# Patient Record
Sex: FEMALE | Race: OTHER | Employment: PART TIME | URBAN - METROPOLITAN AREA
[De-identification: names, ages, dates, MRNs, and addresses within clinical notes are randomized per-mention and may not be internally consistent; named-entity substitution may affect disease eponyms.]

---

## 2017-07-28 ENCOUNTER — HOSPITAL ENCOUNTER (EMERGENCY)
Age: 42
Discharge: HOME | End: 2017-07-28
Attending: EMERGENCY MEDICINE
Payer: SELF-PAY

## 2017-07-28 ENCOUNTER — APPOINTMENT (OUTPATIENT)
Dept: GENERAL RADIOLOGY | Age: 42
End: 2017-07-28
Attending: EMERGENCY MEDICINE
Payer: SELF-PAY

## 2017-07-28 VITALS
WEIGHT: 191 LBS | HEIGHT: 64 IN | SYSTOLIC BLOOD PRESSURE: 103 MMHG | RESPIRATION RATE: 20 BRPM | HEART RATE: 60 BPM | TEMPERATURE: 98.2 F | OXYGEN SATURATION: 96 % | DIASTOLIC BLOOD PRESSURE: 65 MMHG | BODY MASS INDEX: 32.61 KG/M2

## 2017-07-28 DIAGNOSIS — R07.9 CHEST PAIN, UNSPECIFIED TYPE: Primary | ICD-10-CM

## 2017-07-28 LAB
ALBUMIN SERPL BCP-MCNC: 3.8 G/DL (ref 3.5–5)
ALBUMIN/GLOB SERPL: 0.8 {RATIO} (ref 1.1–2.2)
ALP SERPL-CCNC: 57 U/L (ref 45–117)
ALT SERPL-CCNC: 20 U/L (ref 12–78)
ANION GAP BLD CALC-SCNC: 6 MMOL/L (ref 5–15)
APPEARANCE UR: CLEAR
APTT PPP: 29 SEC (ref 22.1–32.5)
AST SERPL W P-5'-P-CCNC: 19 U/L (ref 15–37)
BACTERIA URNS QL MICRO: NEGATIVE /HPF
BASOPHILS # BLD AUTO: 0.1 K/UL (ref 0–0.1)
BASOPHILS # BLD: 1 % (ref 0–1)
BILIRUB SERPL-MCNC: 0.4 MG/DL (ref 0.2–1)
BILIRUB UR QL: NEGATIVE
BUN SERPL-MCNC: 12 MG/DL (ref 6–20)
BUN/CREAT SERPL: 16 (ref 12–20)
CALCIUM SERPL-MCNC: 9.2 MG/DL (ref 8.5–10.1)
CHLORIDE SERPL-SCNC: 102 MMOL/L (ref 97–108)
CK SERPL-CCNC: 73 U/L (ref 26–192)
CO2 SERPL-SCNC: 27 MMOL/L (ref 21–32)
COLOR UR: NORMAL
CREAT SERPL-MCNC: 0.76 MG/DL (ref 0.55–1.02)
D DIMER PPP FEU-MCNC: 0.35 MG/L FEU (ref 0–0.65)
EOSINOPHIL # BLD: 0.4 K/UL (ref 0–0.4)
EOSINOPHIL NFR BLD: 4 % (ref 0–7)
EPITH CASTS URNS QL MICRO: NORMAL /LPF
ERYTHROCYTE [DISTWIDTH] IN BLOOD BY AUTOMATED COUNT: 13.1 % (ref 11.5–14.5)
GLOBULIN SER CALC-MCNC: 5 G/DL (ref 2–4)
GLUCOSE SERPL-MCNC: 86 MG/DL (ref 65–100)
GLUCOSE UR STRIP.AUTO-MCNC: NEGATIVE MG/DL
HCG UR QL: NEGATIVE
HCT VFR BLD AUTO: 41 % (ref 35–47)
HGB BLD-MCNC: 13.8 G/DL (ref 11.5–16)
HGB UR QL STRIP: NEGATIVE
HYALINE CASTS URNS QL MICRO: NORMAL /LPF (ref 0–5)
INR PPP: 1 (ref 0.9–1.1)
KETONES UR QL STRIP.AUTO: NEGATIVE MG/DL
LEUKOCYTE ESTERASE UR QL STRIP.AUTO: NEGATIVE
LIPASE SERPL-CCNC: 187 U/L (ref 73–393)
LYMPHOCYTES # BLD AUTO: 29 % (ref 12–49)
LYMPHOCYTES # BLD: 2.8 K/UL (ref 0.8–3.5)
MCH RBC QN AUTO: 28.5 PG (ref 26–34)
MCHC RBC AUTO-ENTMCNC: 33.7 G/DL (ref 30–36.5)
MCV RBC AUTO: 84.5 FL (ref 80–99)
MONOCYTES # BLD: 0.9 K/UL (ref 0–1)
MONOCYTES NFR BLD AUTO: 9 % (ref 5–13)
NEUTS SEG # BLD: 5.6 K/UL (ref 1.8–8)
NEUTS SEG NFR BLD AUTO: 57 % (ref 32–75)
NITRITE UR QL STRIP.AUTO: NEGATIVE
PH UR STRIP: 6 [PH] (ref 5–8)
PLATELET # BLD AUTO: 289 K/UL (ref 150–400)
POTASSIUM SERPL-SCNC: 4 MMOL/L (ref 3.5–5.1)
PROT SERPL-MCNC: 8.8 G/DL (ref 6.4–8.2)
PROT UR STRIP-MCNC: NEGATIVE MG/DL
PROTHROMBIN TIME: 10.6 SEC (ref 9–11.1)
RBC # BLD AUTO: 4.85 M/UL (ref 3.8–5.2)
RBC #/AREA URNS HPF: NORMAL /HPF (ref 0–5)
SODIUM SERPL-SCNC: 135 MMOL/L (ref 136–145)
SP GR UR REFRACTOMETRY: <1.005 (ref 1–1.03)
THERAPEUTIC RANGE,PTTT: NORMAL SECS (ref 58–77)
TROPONIN I SERPL-MCNC: <0.04 NG/ML
UROBILINOGEN UR QL STRIP.AUTO: 0.2 EU/DL (ref 0.2–1)
WBC # BLD AUTO: 9.8 K/UL (ref 3.6–11)
WBC URNS QL MICRO: NORMAL /HPF (ref 0–4)

## 2017-07-28 PROCEDURE — 99284 EMERGENCY DEPT VISIT MOD MDM: CPT

## 2017-07-28 PROCEDURE — 82550 ASSAY OF CK (CPK): CPT | Performed by: EMERGENCY MEDICINE

## 2017-07-28 PROCEDURE — 85379 FIBRIN DEGRADATION QUANT: CPT | Performed by: EMERGENCY MEDICINE

## 2017-07-28 PROCEDURE — 83690 ASSAY OF LIPASE: CPT | Performed by: EMERGENCY MEDICINE

## 2017-07-28 PROCEDURE — 81001 URINALYSIS AUTO W/SCOPE: CPT | Performed by: EMERGENCY MEDICINE

## 2017-07-28 PROCEDURE — 36415 COLL VENOUS BLD VENIPUNCTURE: CPT | Performed by: EMERGENCY MEDICINE

## 2017-07-28 PROCEDURE — 85730 THROMBOPLASTIN TIME PARTIAL: CPT | Performed by: EMERGENCY MEDICINE

## 2017-07-28 PROCEDURE — 80053 COMPREHEN METABOLIC PANEL: CPT | Performed by: EMERGENCY MEDICINE

## 2017-07-28 PROCEDURE — 85610 PROTHROMBIN TIME: CPT | Performed by: EMERGENCY MEDICINE

## 2017-07-28 PROCEDURE — 93005 ELECTROCARDIOGRAM TRACING: CPT

## 2017-07-28 PROCEDURE — 71020 XR CHEST PA LAT: CPT

## 2017-07-28 PROCEDURE — 81025 URINE PREGNANCY TEST: CPT | Performed by: EMERGENCY MEDICINE

## 2017-07-28 PROCEDURE — 84484 ASSAY OF TROPONIN QUANT: CPT | Performed by: EMERGENCY MEDICINE

## 2017-07-28 PROCEDURE — 85025 COMPLETE CBC W/AUTO DIFF WBC: CPT | Performed by: EMERGENCY MEDICINE

## 2017-07-28 RX ORDER — CETIRIZINE HCL 10 MG
10 TABLET ORAL DAILY
COMMUNITY
End: 2020-10-08

## 2017-07-28 RX ORDER — HYDROXYZINE 50 MG/1
50 TABLET, FILM COATED ORAL
COMMUNITY
End: 2019-12-23

## 2017-07-28 RX ORDER — ATORVASTATIN CALCIUM 10 MG/1
10 TABLET, FILM COATED ORAL DAILY
COMMUNITY
End: 2019-12-23

## 2017-07-28 NOTE — Clinical Note
Thank you for allowing us to provide you with medical care today. We realize that you have many choices for your emergency care needs. We thank you for choosing [address].  Please choose us in the future for any continued health care need s. We hope we addressed all of your medical concerns. We strive to provide excellent quality care in the Emergency Department. Anything less than excellent does not meet our expectations. The exam and treatment you received in the Emergency Depa rtment were for an emergent problem and are not intended as complete care. It is important that you follow up with a doctor, nurse practitioner, or [address] [place] [address] assistant for ongoing care. If your symptoms worsen or you do not improve as expected and you  are unable to reach your usual health care provider, you should return to the Emergency Department. We are available 24 hours a day. Take this sheet with you when you go to your follow-up visit. If you have any problem arranging the follow-up vis it, contact the Emergency Department immediately. Make an appointment your family doctor for follow up of this visit. Return to the ER if you are unable to be seen in a timely manner.

## 2017-07-28 NOTE — ED PROVIDER NOTES
HPI Comments: 38 y.o. female with past medical history significant for lymphoma who presents to the ED with chief complaint of chest pain. Patient reports \"constant, dull\" chest pain and intermittent chills with onset ~5-6 hours ago. Patient reports similar symptoms ~2 days ago. Patient reports her mother passed away ~2 days ago; reports being under increased stress due to her loss. Patient reports being seen by her PCP for her symptoms; reports being referred to the ED for further evaluation for continued symptoms. Patient denies abdominal pain, vomiting, and loss of appetite. There are no other acute medical concerns at this time. PCP: See Beverly MD    Note written by Michelle Olson, as dictated by Chaparrita Shepard MD 5:12 PM     The history is provided by the patient. Past Medical History:   Diagnosis Date    Lymphoma [facility])        History reviewed. No pertinent surgical history. History reviewed. No pertinent family history. Social History     Social History    Marital status:      Spouse name: N/A    Number of children: N/A    Years of education: N/A     Occupational History    Not on file. Social History Main Topics    Smoking status: Never Smoker    Smokeless tobacco: Not on file    Alcohol use No    Drug use: Not on file    Sexual activity: Not on file     Other Topics Concern    Not on file     Social History Narrative         ALLERGIES: Iodinated contrast- oral and iv dye    Review of Systems   Constitutional: Positive for chills. Negative for appetite change. Cardiovascular: Positive for chest pain. Gastrointestinal: Negative for abdominal pain and vomiting. All other systems reviewed and are negative.       Vitals:    07/28/17 1541   BP: 125/84   Pulse: 76   Resp: 18   Temp: 98.3 °F (36.8 °C)   SpO2: 96%   Weight: 86.6 kg (191 lb)   Height: 5' 4\" (1.626 m)            Physical Exam   Constitutional: She is oriented to person, place, and time. She appears well-developed and well-nourished. No distress. NAD, AxOx4, speaking in complete sentences     HENT:   Head: Normocephalic and atraumatic. Nose: Nose normal.   Mouth/Throat: Oropharynx is clear and moist.   Eyes: Conjunctivae and EOM are normal. Pupils are equal, round, and reactive to light. Right eye exhibits no discharge. Left eye exhibits no discharge. No scleral icterus. Neck: Normal range of motion. Neck supple. No JVD present. No tracheal deviation present. Cardiovascular: Normal rate, regular rhythm, normal heart sounds and intact distal pulses. Exam reveals no gallop and no friction rub. No murmur heard. Pulmonary/Chest: Effort normal and breath sounds normal. No respiratory distress. She has no wheezes. She has no rales. She exhibits no tenderness. Abdominal: Soft. Bowel sounds are normal. There is no tenderness. There is no rebound and no guarding. nttp   Genitourinary: No vaginal discharge found. Musculoskeletal: Normal range of motion. She exhibits no edema or tenderness. Neurological: She is alert and oriented to person, place, and time. She has normal reflexes. No cranial nerve deficit. She exhibits normal muscle tone. Coordination normal.   pt has motor/ CV/ Sensation grossly intact to all extremities, R = L in strength;   Skin: Skin is warm and dry. No rash noted. No erythema. No pallor. Psychiatric: She has a normal mood and affect. Her behavior is normal. Thought content normal.   Sad about her mother's passing 3 d ago/ cp x 2d since;    Nursing note and vitals reviewed. [facility]  ED Course       Procedures      Chief Complaint   Patient presents with    Chest Pain    Shortness of Breath       6:02 PM  The patients presenting problems have been discussed, and they are in agreement with the care plan formulated and outlined with them. I have encouraged them to ask questions as they arise throughout their visit.     MEDICATIONS GIVEN:  Medications - No data to display    LABS REVIEWED:  Labs Reviewed   METABOLIC PANEL, COMPREHENSIVE - Abnormal; Notable for the following:        Result Value    Sodium 135 (*)     Protein, total 8.8 (*)     Globulin 5.0 (*)     A-G Ratio 0.8 (*)     All other components within normal limits   CBC WITH AUTOMATED DIFF   CK W/ REFLX CKMB   PROTHROMBIN TIME + INR   PTT   D DIMER   LIPASE   HCG URINE, QL   URINALYSIS W/MICROSCOPIC   TROPONIN I   SAMPLES BEING HELD       RADIOLOGY RESULTS:  The following have been ordered and reviewed:  _____________________________________________________________________  _____________________________________________________________________    EKG interpretation: (Preliminary)  Rhythm: normal sinus rhythm; and regular . Rate (approx.): 70; Axis: normal; P wave: normal; QRS interval: normal ; ST/T wave: normal; Negative acute significant segmental elevations    PROCEDURES:        CONSULTATIONS:       PROGRESS NOTES:      DIAGNOSIS:    1. Chest pain, unspecified type        PLAN:  1-Chest Pain / neg ed evaluation - will have pt follow-up with Cardiology;       ED COURSE: The patients hospital course has been uncomplicated. 6:32 PM  Kalie Wiggins's  results have been reviewed with her. She has been counseled regarding her diagnosis. She verbally conveys understanding and agreement of the signs, symptoms, diagnosis, treatment and prognosis and additionally agrees to Call/ Arrange follow up as recommended with Dr. Aniyah Navarrete MD in 24 - 48 hours. She also agrees with the care-plan and conveys that all of her questions have been answered. I have also put together some discharge instructions for her that include: 1) educational information regarding their diagnosis, 2) how to care for their diagnosis at home, as well a 3) list of reasons why they would want to return to the ED prior to their follow-up appointment, should their condition change or for concerns.

## 2017-07-28 NOTE — DISCHARGE INSTRUCTIONS
Chest Pain: Care Instructions  Your Care Instructions  There are many things that can cause chest pain. Some are not serious and will get better on their own in a few days. But some kinds of chest pain need more testing and treatment. Your doctor may have recommended a follow-up visit in the next 8 to 12 hours. If you are not getting better, you may need more tests or treatment. Even though your doctor has released you, you still need to watch for any problems. The doctor carefully checked you, but sometimes problems can develop later. If you have new symptoms or if your symptoms do not get better, get medical care right away. If you have worse or different chest pain or pressure that lasts more than 5 minutes or you passed out (lost consciousness), call 911 or seek other emergency help right away. A medical visit is only one step in your treatment. Even if you feel better, you still need to do what your doctor recommends, such as going to all suggested follow-up appointments and taking medicines exactly as directed. This will help you recover and help prevent future problems. How can you care for yourself at home? · Rest until you feel better. · Take your medicine exactly as prescribed. Call your doctor if you think you are having a problem with your medicine. · Do not drive after taking a prescription pain medicine. When should you call for help? Call 911 if:  · You passed out (lost consciousness). · You have severe difficulty breathing. · You have symptoms of a heart attack. These may include:  ¨ Chest pain or pressure, or a strange feeling in your chest.  ¨ Sweating. ¨ Shortness of breath. ¨ Nausea or vomiting. ¨ Pain, pressure, or a strange feeling in your back, neck, jaw, or upper belly or in one or both shoulders or arms. ¨ Lightheadedness or sudden weakness. ¨ A fast or irregular heartbeat.   After you call 911, the  may tell you to chew 1 adult-strength or 2 to 4 low-dose aspirin. Wait for an ambulance. Do not try to drive yourself. Call your doctor today if:  · You have any trouble breathing. · Your chest pain gets worse. · You are dizzy or lightheaded, or you feel like you may faint. · You are not getting better as expected. · You are having new or different chest pain. Where can you learn more? Go to http://bing-willis.info/. Enter A120 in the search box to learn more about \"Chest Pain: Care Instructions. \"  Current as of: March 20, 2017  Content Version: 11.3  © 9729-2789 [employer]. Care instructions adapted under license by [employer] (which disclaims liability or warranty for this information). If you have questions about a medical condition or this instruction, always ask your healthcare professional. [address] any warranty or liability for your use of this information. We hope that we have addressed all of your medical concerns. The examination and treatment you received in the Emergency Department were for an emergent problem and were not intended as complete care. It is important that you follow up with your healthcare provider(s) for ongoing care. If your symptoms worsen or do not improve as expected, and you are unable to reach your usual health care provider(s), you should return to the Emergency Department. Today's healthcare is undergoing tremendous change, and patient satisfaction surveys are one of the many tools to assess the quality of medical care. You may receive a survey from the [employer] organization regarding your experience in the Emergency Department. I hope that your experience has been completely positive, particularly the medical care that I provided. As such, please participate in the survey; anything less than excellent does not meet my expectations or intentions.         [facility] and [facility] participate in nationally recognized quality of care measures. If your blood pressure is greater than 120/80, as reported below, we urge that you seek medical care to address the potential of high blood pressure, commonly known as hypertension. Hypertension can be hereditary or can be caused by certain medical conditions, pain, stress, or \"white coat syndrome. \"       Please make an appointment with your health care provider(s) for follow up of your Emergency Department visit. VITALS:   Patient Vitals for the past 8 hrs:   Temp Pulse Resp BP SpO2   07/28/17 1541 98.3 °F (36.8 °C) 76 18 125/84 96 %          Thank you for allowing us to provide you with medical care today. We realize that you have many choices for your emergency care needs. Please choose us in the future for any continued health care needs. [address]ion[address] Sage [address], [address] [facility].   Office: [phone]            Recent Results (from the past 24 hour(s))   EKG, 12 LEAD, INITIAL    Collection Time: 07/28/17  3:51 PM   Result Value Ref Range    Ventricular Rate 70 BPM    Atrial Rate 70 BPM    P-R Interval 122 ms    QRS Duration 74 ms    Q-T Interval 400 ms    QTC Calculation (Bezet) 432 ms    Calculated P Axis 36 degrees    Calculated R Axis 48 degrees    Calculated T Axis 41 degrees    Diagnosis       ** Poor data quality, interpretation may be adversely affected  Normal sinus rhythm  When compared with ECG of 10-OCT-2014 20:55,  No significant change was found     CBC WITH AUTOMATED DIFF    Collection Time: 07/28/17  4:43 PM   Result Value Ref Range    WBC 9.8 3.6 - 11.0 K/uL    RBC 4.85 3.80 - 5.20 M/uL    HGB 13.8 11.5 - 16.0 g/dL    HCT 41.0 35.0 - 47.0 %    MCV 84.5 80.0 - 99.0 FL    MCH 28.5 26.0 - 34.0 PG    MCHC 33.7 30.0 - 36.5 g/dL    RDW 13.1 11.5 - 14.5 %    PLATELET 971 268 - 962 K/uL    NEUTROPHILS 57 32 - 75 %    LYMPHOCYTES 29 12 - 49 %    MONOCYTES 9 5 - 13 %    EOSINOPHILS 4 0 - 7 %    BASOPHILS 1 0 - 1 %    ABS. NEUTROPHILS 5.6 1.8 - 8.0 K/UL    ABS. LYMPHOCYTES 2.8 0.8 - 3.5 K/UL    ABS. MONOCYTES 0.9 0.0 - 1.0 K/UL    ABS. EOSINOPHILS 0.4 0.0 - 0.4 K/UL    ABS. BASOPHILS 0.1 0.0 - 0.1 K/UL   METABOLIC PANEL, COMPREHENSIVE    Collection Time: 07/28/17  4:43 PM   Result Value Ref Range    Sodium 135 (L) 136 - 145 mmol/L    Potassium 4.0 3.5 - 5.1 mmol/L    Chloride 102 97 - 108 mmol/L    CO2 27 21 - 32 mmol/L    Anion gap 6 5 - 15 mmol/L    Glucose 86 65 - 100 mg/dL    BUN 12 6 - 20 MG/DL    Creatinine 0.76 0.55 - 1.02 MG/DL    BUN/Creatinine ratio 16 12 - 20      GFR est AA >60 >60 ml/min/1.73m2    GFR est non-AA >60 >60 ml/min/1.73m2    Calcium 9.2 8.5 - 10.1 MG/DL    Bilirubin, total 0.4 0.2 - 1.0 MG/DL    ALT (SGPT) 20 12 - 78 U/L    AST (SGOT) 19 15 - 37 U/L    Alk. phosphatase 57 45 - 117 U/L    Protein, total 8.8 (H) 6.4 - 8.2 g/dL    Albumin 3.8 3.5 - 5.0 g/dL    Globulin 5.0 (H) 2.0 - 4.0 g/dL    A-G Ratio 0.8 (L) 1.1 - 2.2     CK W/ REFLX CKMB    Collection Time: 07/28/17  4:43 PM   Result Value Ref Range    CK 73 26 - 192 U/L   PROTHROMBIN TIME + INR    Collection Time: 07/28/17  4:43 PM   Result Value Ref Range    INR 1.0 0.9 - 1.1      Prothrombin time 10.6 9.0 - 11.1 sec   PTT    Collection Time: 07/28/17  4:43 PM   Result Value Ref Range    aPTT 29.0 22.1 - 32.5 sec    aPTT, therapeutic range     58.0 - 77.0 SECS   D DIMER    Collection Time: 07/28/17  4:43 PM   Result Value Ref Range    D-dimer 0.35 0.00 - 0.65 mg/L FEU   LIPASE    Collection Time: 07/28/17  4:43 PM   Result Value Ref Range    Lipase 187 73 - 393 U/L       Xr Chest Pa Lat    Result Date: 7/28/2017  Exam:  2 view chest Indication: Chest pain and shortness of breath beginning this morning and getting worse. COMPARISON: 5-2-2007 PA and lateral views demonstrate normal heart size. The patient is on a cardiac monitor. The lungs demonstrate peribronchial cuffing in the perihilar regions.  Additionally there is some increased opacity in the lung fields in both paramediastinal regions in the upper lung zones. All these findings are similar to the previous examination. The peribronchial cuffing could be related to asthma or chronic bronchitic type process. The increased opacities in the paramediastinal regions bilaterally could be related to post radiation change in this patient with a history of lymphoma. Clinical correlation is suggested. There is no focal airspace disease to suggest acute pneumonia. No adenopathy or pleural effusions. IMPRESSION: 1. Chronic changes in the lungs with peribronchial cuffing as described similar to the previous examination. Additionally there are opacities in the paramediastinal regions bilaterally in the upper lung zones that are also stable and could be related to post radiation change.  Clinical correlation is suggested

## 2017-07-29 LAB
ATRIAL RATE: 70 BPM
CALCULATED P AXIS, ECG09: 36 DEGREES
CALCULATED R AXIS, ECG10: 48 DEGREES
CALCULATED T AXIS, ECG11: 41 DEGREES
DIAGNOSIS, 93000: NORMAL
P-R INTERVAL, ECG05: 122 MS
Q-T INTERVAL, ECG07: 400 MS
QRS DURATION, ECG06: 74 MS
QTC CALCULATION (BEZET), ECG08: 432 MS
VENTRICULAR RATE, ECG03: 70 BPM

## 2017-11-16 ENCOUNTER — HOSPITAL ENCOUNTER (EMERGENCY)
Age: 42
Discharge: HOME | End: 2017-11-16
Attending: FAMILY MEDICINE

## 2017-11-16 VITALS
SYSTOLIC BLOOD PRESSURE: 110 MMHG | TEMPERATURE: 97.8 F | DIASTOLIC BLOOD PRESSURE: 55 MMHG | OXYGEN SATURATION: 98 % | RESPIRATION RATE: 16 BRPM | HEART RATE: 88 BPM | HEIGHT: 64 IN | WEIGHT: 191 LBS | BODY MASS INDEX: 32.61 KG/M2

## 2017-11-16 DIAGNOSIS — J02.9 ACUTE VIRAL PHARYNGITIS: Primary | ICD-10-CM

## 2017-11-16 RX ORDER — BENZONATATE 200 MG/1
200 CAPSULE ORAL
Qty: 21 CAP | Refills: 0 | Status: SHIPPED | OUTPATIENT
Start: 2017-11-16 | End: 2017-11-23

## 2017-11-16 RX ORDER — PREDNISONE 5 MG/1
TABLET ORAL
Qty: 21 TAB | Refills: 0 | Status: SHIPPED | OUTPATIENT
Start: 2017-11-16 | End: 2019-12-23

## 2017-11-16 NOTE — LETTER
[facility] 
[address] [address] 
[phone] Work/School Note Date: 11/16/2017 To Whom It May concern: 
 
Jemal Martinez was seen and treated today in the urgent care center by the following provider(s): 
Attending Provider: Christ Clarke MD.   
 
Jemal Martinez may return to work on 11/17/17. Sincerely, Christ Clarke MD

## 2017-11-17 NOTE — UC PROVIDER NOTE
Patient is a 38 y.o. female presenting with cold symptoms. The history is provided by the patient. Cold Symptoms    This is a new problem. The current episode started yesterday. The problem has not changed since onset. There has been no fever. Associated symptoms include congestion, ear pain and plugged ear sensation. Pertinent negatives include no sneezing. She has tried nothing for the symptoms. Past Medical History:   Diagnosis Date    Lymphoma [facility])         History reviewed. No pertinent surgical history. History reviewed. No pertinent family history. Social History     Social History    Marital status:      Spouse name: N/A    Number of children: N/A    Years of education: N/A     Occupational History    Not on file. Social History Main Topics    Smoking status: Never Smoker    Smokeless tobacco: Not on file    Alcohol use No    Drug use: Not on file    Sexual activity: Not on file     Other Topics Concern    Not on file     Social History Narrative                ALLERGIES: Iodinated contrast- oral and iv dye    Review of Systems   HENT: Positive for congestion and ear pain. Negative for sneezing. Vitals:    11/16/17 1936   BP: 110/55   Pulse: 88   Resp: 16   Temp: 97.8 °F (36.6 °C)   SpO2: 98%   Weight: 86.6 kg (191 lb)   Height: 5' 4\" (1.626 m)       Physical Exam   Constitutional: No distress. HENT:   Right Ear: Tympanic membrane and ear canal normal.   Left Ear: Tympanic membrane and ear canal normal.   Nose: Nose normal.   Mouth/Throat: No oropharyngeal exudate, posterior oropharyngeal edema or posterior oropharyngeal erythema. Eyes: Conjunctivae are normal. Right eye exhibits no discharge. Left eye exhibits no discharge. Neck: Neck supple. Pulmonary/Chest: Effort normal and breath sounds normal. No respiratory distress. She has no wheezes. She has no rales. Lymphadenopathy:     She has no cervical adenopathy. Skin: No rash noted.    Nursing note and vitals reviewed. MDM     Differential Diagnosis; Clinical Impression; Plan:     CLINICAL IMPRESSION:  Acute viral pharyngitis  (primary encounter diagnosis)      DDX    Plan:    Fluids/ gargles  Claritin/ allegra   Tylenol cold-sinus - max strength 1-2 tab 4 times/ day    with Advil as needed    If not better in 4-5 days - may use prednisone  mucinex DM     Amount and/or Complexity of Data Reviewed:    Review and summarize past medical records:  Yes  Risk of Significant Complications, Morbidity, and/or Mortality:   Presenting problems: Moderate  Management options:   Moderate  Progress:   Patient progress:  Stable      Procedures

## 2017-11-17 NOTE — DISCHARGE INSTRUCTIONS
Sore Throat: Care Instructions  Your Care Instructions    Infection by bacteria or a virus causes most sore throats. Cigarette smoke, dry air, air pollution, allergies, and yelling can also cause a sore throat. Sore throats can be painful and annoying. Fortunately, most sore throats go away on their own. If you have a bacterial infection, your doctor may prescribe antibiotics. Follow-up care is a key part of your treatment and safety. Be sure to make and go to all appointments, and call your doctor if you are having problems. It's also a good idea to know your test results and keep a list of the medicines you take. How can you care for yourself at home? · If your doctor prescribed antibiotics, take them as directed. Do not stop taking them just because you feel better. You need to take the full course of antibiotics. · Gargle with warm salt water once an hour to help reduce swelling and relieve discomfort. Use 1 teaspoon of salt mixed in 1 cup of warm water. · Take an over-the-counter pain medicine, such as acetaminophen (Tylenol), ibuprofen (Advil, Motrin), or naproxen (Aleve). Read and follow all instructions on the label. · Be careful when taking over-the-counter cold or flu medicines and Tylenol at the same time. Many of these medicines have acetaminophen, which is Tylenol. Read the labels to make sure that you are not taking more than the recommended dose. Too much acetaminophen (Tylenol) can be harmful. · Drink plenty of fluids. Fluids may help soothe an irritated throat. Hot fluids, such as tea or soup, may help decrease throat pain. · Use over-the-counter throat lozenges to soothe pain. Regular cough drops or hard candy may also help. These should not be given to young children because of the risk of choking. · Do not smoke or allow others to smoke around you. If you need help quitting, talk to your doctor about stop-smoking programs and medicines.  These can increase your chances of quitting for good. · Use a vaporizer or humidifier to add moisture to your bedroom. Follow the directions for cleaning the machine. When should you call for help? Call your doctor now or seek immediate medical care if:  ? · You have new or worse trouble swallowing. ? · Your sore throat gets much worse on one side. ? Watch closely for changes in your health, and be sure to contact your doctor if you do not get better as expected. Where can you learn more? Go to http://bing-willis.info/. Enter 062 [id number] 80 [id number] in the search box to learn more about \"Sore Throat: Care Instructions. \"  Current as of: May 12, 2017  Content Version: 11.4  © 1779-8495 [employer]. Care instructions adapted under license by [employer] (which disclaims liability or warranty for this information). If you have questions about a medical condition or this instruction, always ask your healthcare professional. [address] any warranty or liability for your use of this information.

## 2019-09-09 ENCOUNTER — OFFICE VISIT (OUTPATIENT)
Dept: URGENT CARE | Age: 44
End: 2019-09-09

## 2019-09-09 VITALS
RESPIRATION RATE: 18 BRPM | TEMPERATURE: 98 F | OXYGEN SATURATION: 100 % | WEIGHT: 189 LBS | HEART RATE: 72 BPM | DIASTOLIC BLOOD PRESSURE: 63 MMHG | BODY MASS INDEX: 31.49 KG/M2 | HEIGHT: 65 IN | SYSTOLIC BLOOD PRESSURE: 110 MMHG

## 2019-09-09 DIAGNOSIS — R07.9 CHEST PAIN, UNSPECIFIED TYPE: Primary | ICD-10-CM

## 2019-09-09 RX ORDER — MINERAL OIL
ENEMA (ML) RECTAL
COMMUNITY
End: 2020-10-08

## 2019-09-09 RX ORDER — ACYCLOVIR 800 MG/1
1000 TABLET ORAL DAILY
COMMUNITY

## 2019-09-09 RX ORDER — GUAIFENESIN 100 MG/5ML
81 LIQUID (ML) ORAL DAILY
COMMUNITY
End: 2019-12-23

## 2019-09-09 RX ORDER — SERTRALINE HYDROCHLORIDE 100 MG/1
TABLET, FILM COATED ORAL DAILY
COMMUNITY
End: 2019-12-23

## 2019-09-09 RX ORDER — HYDROXYZINE 25 MG/1
25 TABLET, FILM COATED ORAL
Qty: 12 TAB | Refills: 0 | Status: SHIPPED | OUTPATIENT
Start: 2019-09-09 | End: 2020-10-08

## 2019-09-09 RX ORDER — ATORVASTATIN CALCIUM 10 MG/1
TABLET, FILM COATED ORAL DAILY
COMMUNITY

## 2019-09-09 NOTE — PATIENT INSTRUCTIONS
Panic Attacks: Care Instructions  Your Care Instructions    During a panic attack, you may have a feeling of intense fear or terror, trouble breathing, chest pain or tightness, heartbeat changes, dizziness, sweating, and shaking. A panic attack starts suddenly and usually lasts from 5 to 20 minutes but may last even longer. You have the most anxiety about 10 minutes after the attack starts. An attack can begin with a stressful event, or it can happen without a cause. Although panic attacks can cause scary symptoms, you can learn to manage them with self-care, counseling, and medicine. Follow-up care is a key part of your treatment and safety. Be sure to make and go to all appointments, and call your doctor if you are having problems. It's also a good idea to know your test results and keep a list of the medicines you take. How can you care for yourself at home? · Take your medicine exactly as directed. Call your doctor if you think you are having a problem with your medicine. · Go to your counseling sessions and follow-up appointments. · Recognize and accept your anxiety. Then, when you are in a situation that makes you anxious, say to yourself, \"This is not an emergency. I feel uncomfortable, but I am not in danger. I can keep going even if I feel anxious. \"  · Be kind to your body:  ? Relieve tension with exercise or a massage. ? Get enough rest.  ? Avoid alcohol, caffeine, nicotine, and illegal drugs. They can increase your anxiety level, cause sleep problems, or trigger a panic attack. ? Learn and do relaxation techniques. See below for more about these techniques. · Engage your mind. Get out and do something you enjoy. Go to a funny movie, or take a walk or hike. Plan your day. Having too much or too little to do can make you anxious. · Keep a record of your symptoms. Discuss your fears with a good friend or family member, or join a support group for people with similar problems.  Talking to others sometimes relieves stress. · Get involved in social groups, or volunteer to help others. Being alone sometimes makes things seem worse than they are. · Get at least 30 minutes of exercise on most days of the week to relieve stress. Walking is a good choice. You also may want to do other activities, such as running, swimming, cycling, or playing tennis or team sports. Relaxation techniques  Do relaxation exercises for 10 to 20 minutes a day. You can play soothing, relaxing music while you do them, if you wish. · Tell others in your house that you are going to do your relaxation exercises. Ask them not to disturb you. · Find a comfortable place, away from all distractions and noise. · Lie down on your back, or sit with your back straight. · Focus on your breathing. Make it slow and steady. · Breathe in through your nose. Breathe out through either your nose or mouth. · Breathe deeply, filling up the area between your navel and your rib cage. Breathe so that your belly goes up and down. · Do not hold your breath. · Breathe like this for 5 to 10 minutes. Notice the feeling of calmness throughout your whole body. As you continue to breathe slowly and deeply, relax by doing the following for another 5 to 10 minutes:  · Tighten and relax each muscle group in your body. You can begin at your toes and work your way up to your head. · Imagine your muscle groups relaxing and becoming heavy. · Empty your mind of all thoughts. · Let yourself relax more and more deeply. · Become aware of the state of calmness that surrounds you. · When your relaxation time is over, you can bring yourself back to alertness by moving your fingers and toes and then your hands and feet and then stretching and moving your entire body. Sometimes people fall asleep during relaxation, but they usually wake up shortly afterward.   · Always give yourself time to return to full alertness before you drive a car or do anything that might cause an accident if you are not fully alert. Never play a relaxation tape while driving a car. When should you call for help? Call 911 anytime you think you may need emergency care. For example, call if:    · You feel you cannot stop from hurting yourself or someone else.    Watch closely for changes in your health, and be sure to contact your doctor if:    · Your panic attacks get worse.     · You have new or different anxiety.     · You are not getting better as expected. Where can you learn more? Go to http://bing-willis.info/. Enter H601 in the search box to learn more about \"Panic Attacks: Care Instructions. \"  Current as of: September 11, 2018  Content Version: 12.1  © 1200-1105 [employer]. Care instructions adapted under license by [employer] (which disclaims liability or warranty for this information). If you have questions about a medical condition or this instruction, always ask your healthcare professional. [place] [address] any warranty or liability for your use of this information. Anxiety Disorder: Care Instructions  Your Care Instructions    Anxiety is a normal reaction to stress. Difficult situations can cause you to have symptoms such as sweaty palms and a nervous feeling. In an anxiety disorder, the symptoms are far more severe. Constant worry, muscle tension, trouble sleeping, nausea and diarrhea, and other symptoms can make normal daily activities difficult or impossible. These symptoms may occur for no reason, and they can affect your work, school, or social life. Medicines, counseling, and self-care can all help. Follow-up care is a key part of your treatment and safety. Be sure to make and go to all appointments, and call your doctor if you are having problems. It's also a good idea to know your test results and keep a list of the medicines you take. How can you care for yourself at home?   · Take medicines exactly as directed. Call your doctor if you think you are having a problem with your medicine. · Go to your counseling sessions and follow-up appointments. · Recognize and accept your anxiety. Then, when you are in a situation that makes you anxious, say to yourself, \"This is not an emergency. I feel uncomfortable, but I am not in danger. I can keep going even if I feel anxious. \"  · Be kind to your body:  ? Relieve tension with exercise or a massage. ? Get enough rest.  ? Avoid alcohol, caffeine, nicotine, and illegal drugs. They can increase your anxiety level and cause sleep problems. ? Learn and do relaxation techniques. See below for more about these techniques. · Engage your mind. Get out and do something you enjoy. Go to a funny movie, or take a walk or hike. Plan your day. Having too much or too little to do can make you anxious. · Keep a record of your symptoms. Discuss your fears with a good friend or family member, or join a support group for people with similar problems. Talking to others sometimes relieves stress. · Get involved in social groups, or volunteer to help others. Being alone sometimes makes things seem worse than they are. · Get at least 30 minutes of exercise on most days of the week to relieve stress. Walking is a good choice. You also may want to do other activities, such as running, swimming, cycling, or playing tennis or team sports. Relaxation techniques  Do relaxation exercises 10 to 20 minutes a day. You can play soothing, relaxing music while you do them, if you wish. · Tell others in your house that you are going to do your relaxation exercises. Ask them not to disturb you. · Find a comfortable place, away from all distractions and noise. · Lie down on your back, or sit with your back straight. · Focus on your breathing. Make it slow and steady. · Breathe in through your nose. Breathe out through either your nose or mouth.   · Breathe deeply, filling up the area between your navel and your rib cage. Breathe so that your belly goes up and down. · Do not hold your breath. · Breathe like this for 5 to 10 minutes. Notice the feeling of calmness throughout your whole body. As you continue to breathe slowly and deeply, relax by doing the following for another 5 to 10 minutes:  · Tighten and relax each muscle group in your body. You can begin at your toes and work your way up to your head. · Imagine your muscle groups relaxing and becoming heavy. · Empty your mind of all thoughts. · Let yourself relax more and more deeply. · Become aware of the state of calmness that surrounds you. · When your relaxation time is over, you can bring yourself back to alertness by moving your fingers and toes and then your hands and feet and then stretching and moving your entire body. Sometimes people fall asleep during relaxation, but they usually wake up shortly afterward. · Always give yourself time to return to full alertness before you drive a car or do anything that might cause an accident if you are not fully alert. Never play a relaxation tape while you drive a car. When should you call for help? Call 911 anytime you think you may need emergency care. For example, call if:    · You feel you cannot stop from hurting yourself or someone else.   Niharika Mckee the numbers for these national suicide hotlines: 2-223-198-TALK (1-849.790.9220) and 6-014-VRAEKMN (0-955.917.4849). If you or someone you know talks about suicide or feeling hopeless, get help right away.   Watch closely for changes in your health, and be sure to contact your doctor if:    · You have anxiety or fear that affects your life.     · You have symptoms of anxiety that are new or different from those you had before. Where can you learn more? Go to http://bing-willis.info/. Enter P754 in the search box to learn more about \"Anxiety Disorder: Care Instructions. \"  Current as of: September 11, 2018  Content Version: 12.1  © 5779-7829 [employer]wise[employer] Inc[employer]. Care instructions adapted under license by [employer] (which disclaims liability or warranty for this information). If you have questions about a medical condition or this instruction, always ask your healthcare professional. [address] any warranty or liability for your use of this information.

## 2019-09-10 NOTE — PROGRESS NOTES
Chest Pain    The history is provided by the patient. This is a new problem. The current episode started 1 to 2 hours ago. The problem has been gradually improving. The pain is associated with normal activity. The pain is present in the substernal region. The pain is mild. The quality of the pain is described as pressure-like. The pain does not radiate. Exacerbated by: nothing  Pertinent negatives include no back pain, no cough, no diaphoresis, no dizziness, no fever, no irregular heartbeat, no nausea, no near-syncope, no palpitations, no PND and no weakness. She has tried nothing for the symptoms. Risk factors include stress. Her past medical history does not include DVT, HTN or PE. Past medical history comments: h/o CVA. Past Medical History:   Diagnosis Date    Non Hodgkin's lymphoma ([facility] [address]     Stroke [facility])         History reviewed. No pertinent surgical history. History reviewed. No pertinent family history.      Social History     Socioeconomic History    Marital status:      Spouse name: Not on file    Number of children: Not on file    Years of education: Not on file    Highest education level: Not on file   Occupational History    Not on file   Social Needs    Financial resource strain: Not on file    Food insecurity:     Worry: Not on file     Inability: Not on file    Transportation needs:     Medical: Not on file     Non-medical: Not on file   Tobacco Use    Smoking status: Never Smoker    Smokeless tobacco: Never Used   Substance and Sexual Activity    Alcohol use: Not on file    Drug use: Not on file    Sexual activity: Not on file   Lifestyle    Physical activity:     Days per week: Not on file     Minutes per session: Not on file    Stress: Not on file   Relationships    Social connections:     Talks on phone: Not on file     Gets together: Not on file     Attends Jew service: Not on file     Active member of club or organization: Not on file     Attends meetings of clubs or organizations: Not on file     Relationship status: Not on file    Intimate partner violence:     Fear of current or ex partner: Not on file     Emotionally abused: Not on file     Physically abused: Not on file     Forced sexual activity: Not on file   Other Topics Concern    Not on file   Social History Narrative    Not on file                ALLERGIES: Hydrocodone and Iodine    Review of Systems   Constitutional: Negative for diaphoresis and fever. Respiratory: Negative for cough. Cardiovascular: Positive for chest pain. Negative for palpitations, PND and near-syncope. Gastrointestinal: Negative for nausea. Musculoskeletal: Negative for back pain. Neurological: Negative for dizziness and weakness. All other systems reviewed and are negative. Vitals:    09/09/19 1730   BP: 110/63   Pulse: 72   Resp: 18   Temp: 98 °F (36.7 °C)   SpO2: 100%   Weight: 189 lb (85.7 kg)   Height: 5' 4.5\" (1.638 m)       Physical Exam   Constitutional: No distress. HENT:   Right Ear: Tympanic membrane and ear canal normal.   Left Ear: Tympanic membrane and ear canal normal.   Nose: Nose normal.   Mouth/Throat: No oropharyngeal exudate, posterior oropharyngeal edema or posterior oropharyngeal erythema. Eyes: Conjunctivae are normal. Right eye exhibits no discharge. Left eye exhibits no discharge. Neck: Neck supple. Cardiovascular: Normal rate, normal heart sounds and intact distal pulses. No murmur heard. Pulmonary/Chest: Effort normal and breath sounds normal. No respiratory distress. She has no wheezes. She has no rales. Lymphadenopathy:     She has no cervical adenopathy. Skin: No rash noted. Nursing note and vitals reviewed.       [facility]    EKG    Date/Time: 9/9/2019 8:38 PM  Performed by: Hosea Johansen MD  Authorized by: Hosea Johansen MD   Rhythm: sinus rhythm  Rate: normal  QRS axis: normal  Conduction: conduction normal  ST Segments: ST segments normal  Other: no other findings  Clinical impression: normal ECG              ICD-10-CM ICD-9-CM    1. Chest pain, unspecified type R07.9 786.50 EKG, 12 LEAD, INITIAL    non cardiac- ? anxiety / panic attack     Medications Ordered Today   Medications    hydrOXYzine HCl (ATARAX) 25 mg tablet     Sig: Take 1 Tab by mouth every eight (8) hours as needed for Anxiety. Dispense:  12 Tab     Refill:  0     No results found for any visits on 09/09/19. The patients condition was discussed with the patient and they understand. The patient is to follow up with primary care doctor. If signs and symptoms become worse the pt is to go to the ER. The patient is to take medications as prescribed.

## 2019-09-27 ENCOUNTER — HOSPITAL ENCOUNTER (EMERGENCY)
Age: 44
Discharge: HOME | End: 2019-09-27
Attending: EMERGENCY MEDICINE
Payer: MEDICAID

## 2019-09-27 ENCOUNTER — OFFICE VISIT (OUTPATIENT)
Dept: URGENT CARE | Age: 44
End: 2019-09-27

## 2019-09-27 ENCOUNTER — APPOINTMENT (OUTPATIENT)
Dept: ULTRASOUND IMAGING | Age: 44
End: 2019-09-27
Attending: EMERGENCY MEDICINE
Payer: MEDICAID

## 2019-09-27 VITALS
BODY MASS INDEX: 32.19 KG/M2 | HEART RATE: 69 BPM | TEMPERATURE: 97.6 F | OXYGEN SATURATION: 95 % | SYSTOLIC BLOOD PRESSURE: 116 MMHG | RESPIRATION RATE: 16 BRPM | WEIGHT: 190.48 LBS | DIASTOLIC BLOOD PRESSURE: 78 MMHG

## 2019-09-27 VITALS
TEMPERATURE: 97.9 F | SYSTOLIC BLOOD PRESSURE: 110 MMHG | HEART RATE: 72 BPM | OXYGEN SATURATION: 99 % | HEIGHT: 65 IN | DIASTOLIC BLOOD PRESSURE: 58 MMHG | BODY MASS INDEX: 31.49 KG/M2 | WEIGHT: 189 LBS | RESPIRATION RATE: 18 BRPM

## 2019-09-27 DIAGNOSIS — M79.89 SWELLING OF RIGHT LOWER EXTREMITY: ICD-10-CM

## 2019-09-27 DIAGNOSIS — M79.661 PAIN OF RIGHT CALF: Primary | ICD-10-CM

## 2019-09-27 DIAGNOSIS — T14.8XXA BRUISE: Primary | ICD-10-CM

## 2019-09-27 DIAGNOSIS — S80.11XA LEG HEMATOMA, RIGHT, INITIAL ENCOUNTER: ICD-10-CM

## 2019-09-27 PROBLEM — I73.9 PERIPHERAL VASCULAR DISEASE (HCC): Status: ACTIVE | Noted: 2019-09-27

## 2019-09-27 PROCEDURE — 99283 EMERGENCY DEPT VISIT LOW MDM: CPT

## 2019-09-27 PROCEDURE — 93971 EXTREMITY STUDY: CPT

## 2019-09-27 RX ORDER — FLUCONAZOLE 150 MG/1
150 TABLET ORAL
COMMUNITY
End: 2022-02-09

## 2019-09-27 NOTE — PATIENT INSTRUCTIONS
Go to one of the following below immediately without delay:    [facility]   ([address])  Clint Simon   [phone]   Open 24 hours    [address]   ([address])  [address] [address]   [id number] 24 hours    [facility]  [address])  [address], [address] [address], [address] [place] [address]   [phone]

## 2019-09-28 NOTE — DISCHARGE INSTRUCTIONS
Patient Education        Bruises: Care Instructions  Your Care Instructions    Bruises occur when small blood vessels under the skin tear or rupture, most often from a twist, bump, or fall. Blood leaks into tissues under the skin and causes a black-and-blue spot that often turns colors, including purplish black, reddish blue, or yellowish green, as the bruise heals. Bruises hurt, but most are not serious and will go away on their own within 2 to 4 weeks. Sometimes, gravity causes them to spread down the body. A leg bruise usually will take longer to heal than a bruise on the face or arms. Follow-up care is a key part of your treatment and safety. Be sure to make and go to all appointments, and call your doctor if you are having problems. It's also a good idea to know your test results and keep a list of the medicines you take. How can you care for yourself at home? · Take pain medicines exactly as directed. ? If the doctor gave you a prescription medicine for pain, take it as prescribed. ? If you are not taking a prescription pain medicine, ask your doctor if you can take an over-the-counter medicine. · Put ice or a cold pack on the area for 10 to 20 minutes at a time. Put a thin cloth between the ice and your skin. · If you can, prop up the bruised area on pillows as much as possible for the next few days. Try to keep the bruise above the level of your heart. When should you call for help? Call your doctor now or seek immediate medical care if:    · You have signs of infection, such as:  ? Increased pain, swelling, warmth, or redness. ? Red streaks leading from the bruise. ? Pus draining from the bruise. ? A fever.     · You have a bruise on your leg and signs of a blood clot, such as:  ? Increasing redness and swelling along with warmth, tenderness, and pain in the bruised area. ? Pain in your calf, back of the knee, thigh, or groin. ?  Redness and swelling in your leg or groin.     · Your pain gets worse.    Watch closely for changes in your health, and be sure to contact your doctor if:    · You do not get better as expected. Where can you learn more? Go to http://bing-willis.info/. Enter [phone] in the search box to learn more about \"Bruises: Care Instructions. \"  Current as of: June 26, 2019  Content Version: 12.2  © 6831-4835 [employer]. Care instructions adapted under license by [employer] (which disclaims liability or warranty for this information). If you have questions about a medical condition or this instruction, always ask your healthcare professional. [place] [address] any warranty or liability for your use of this information. Patient Education     Contusion: Care Instructions  Your Care Instructions  Contusion is the medical term for a bruise. It is the result of a direct blow or an impact, such as a fall. Contusions are common sports injuries. Most people think of a bruise as a black-and-blue spot. This happens when small blood vessels get torn and leak blood under the skin. But bones, muscles, and organs can also get bruised. This may damage deep tissues but not cause a bruise you can see. The doctor will do a physical exam to find the location of your contusion. You may also have tests to make sure you do not have a more serious injury, such as a broken bone or nerve damage. These may include X-rays or other imaging tests like a CT scan or MRI. Deep-tissue contusions may cause pain and swelling. But if there is no serious damage, they will often get better in a few weeks with home treatment. The doctor has checked you carefully, but problems can develop later. If you notice any problems or new symptoms, get medical treatment right away. Follow-up care is a key part of your treatment and safety. Be sure to make and go to all appointments, and call your doctor if you are having problems.  It's also a good idea to know your test results and keep a list of the medicines you take. How can you care for yourself at home? · Put ice or a cold pack on the sore area for 10 to 20 minutes at a time to stop swelling. Put a thin cloth between the ice pack and your skin. · Be safe with medicines. Read and follow all instructions on the label. ¨ If the doctor gave you a prescription medicine for pain, take it as prescribed. ¨ If you are not taking a prescription pain medicine, ask your doctor if you can take an over-the-counter medicine. · If you can, prop up the sore area on pillows as much as possible for the next few days. Try to keep the sore area above the level of your heart. When should you call for help? Call your doctor now or seek immediate medical care if:  · Your pain gets worse. · You have new or worse swelling. · You have tingling, weakness, or numbness in the area near the contusion. · The area near the contusion is cold or pale. Watch closely for changes in your health, and be sure to contact your doctor if:  · You do not get better as expected. Where can you learn more? Go to gDine.be  Enter [id number] in the search box to learn more about \"Contusion: Care Instructions. \"   © 0987-7348 Healthwise, Incorporated. Care instructions adapted under license by [facility] (which disclaims liability or warranty for this information). This care instruction is for use with your licensed healthcare professional. If you have questions about a medical condition or this instruction, always ask your healthcare professional. [place] [address] any warranty or liability for your use of this information.   Content Version: 31.9.225363; Current as of: May 22, 2015

## 2019-09-28 NOTE — ED NOTES
I have reviewed discharge instructions with the patient. The patient verbalized understanding of all discharge instructions and follow up care. The patient signed discharge summary with no further questions or concerns. Patient is A&Ox4. Respirations are even and unlabored. Skin is warm and dry. Patient not in any acute distress at discharge. Patient left ED ambulatory with a steady, even gait.

## 2019-09-28 NOTE — ED PROVIDER NOTES
43-year-old female presents to the emergency department with right leg pain and right leg bruising. Patient reports that she has noticed bruising and pain to the outside of her right leg over the past couple of days. She does not remember hitting her leg on anything. She says the pain is mild. The pain is worse with palpation. The pain does not radiate. Patient is not on blood thinners. 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 She mostly has skin manifestations. She is on an oral medication for this. No previous history of DVTs or PEs. No long car trips or plane rides recently. Patient did not denies shortness of breath or chest pain. No vomiting or diarrhea. No abdominal pain. Patient denies any fevers. Past Medical History:   Diagnosis Date    Non Hodgkin's lymphoma ([facility] [address]     Stroke [facility])        History reviewed. No pertinent surgical history. History reviewed. No pertinent family history.     Social History     Socioeconomic History    Marital status:      Spouse name: Not on file    Number of children: Not on file    Years of education: Not on file    Highest education level: Not on file   Occupational History    Not on file   Social Needs    Financial resource strain: Not on file    Food insecurity:     Worry: Not on file     Inability: Not on file    Transportation needs:     Medical: Not on file     Non-medical: Not on file   Tobacco Use    Smoking status: Never Smoker    Smokeless tobacco: Never Used   Substance and Sexual Activity    Alcohol use: Never     Frequency: Never    Drug use: Never    Sexual activity: Not on file   Lifestyle    Physical activity:     Days per week: Not on file     Minutes per session: Not on file    Stress: Not on file   Relationships    Social connections:     Talks on phone: Not on file     Gets together: Not on file     Attends Congregation service: Not on file     Active member of club or organization: Not on file     Attends meetings of clubs or organizations: Not on file     Relationship status: Not on file    Intimate partner violence:     Fear of current or ex partner: Not on file     Emotionally abused: Not on file     Physically abused: Not on file     Forced sexual activity: Not on file   Other Topics Concern    Not on file   Social History Narrative    Not on file         ALLERGIES: Hydrocodone and Iodine    Review of Systems   Constitutional: Negative for fever. HENT: Negative for facial swelling. Eyes: Negative for pain. Respiratory: Negative for cough, chest tightness and shortness of breath. Cardiovascular: Negative for chest pain and leg swelling. Gastrointestinal: Negative for abdominal pain and vomiting. Endocrine: Negative for polyuria. Genitourinary: Negative for difficulty urinating and flank pain. Musculoskeletal: Positive for arthralgias. Negative for back pain. Skin: Positive for color change. Allergic/Immunologic: Negative for immunocompromised state. Neurological: Negative for dizziness and headaches. Hematological: Does not bruise/bleed easily. Psychiatric/Behavioral: Negative for confusion. All other systems reviewed and are negative. There were no vitals filed for this visit. Physical Exam   Constitutional: She is oriented to person, place, and time. She appears well-developed and well-nourished. HENT:   Head: Normocephalic and atraumatic. Right Ear: External ear normal.   Left Ear: External ear normal.   Nose: Nose normal.   Mouth/Throat: Oropharynx is clear and moist.   Eyes: Pupils are equal, round, and reactive to light. EOM are normal. No scleral icterus. Neck: Normal range of motion. Neck supple. No JVD present. No tracheal deviation present. No thyromegaly present. Cardiovascular: Normal rate, regular rhythm, normal heart sounds and intact distal pulses. Exam reveals no friction rub. No murmur heard.   Pulmonary/Chest: Effort normal and breath sounds normal. No stridor. No respiratory distress. She has no wheezes. She has no rales. She exhibits no tenderness. Abdominal: Soft. Bowel sounds are normal. She exhibits no distension. There is no tenderness. There is no rebound and no guarding. Musculoskeletal: Normal range of motion. She exhibits tenderness. She exhibits no edema. Mild tenderness in the right lateral leg. She has approximately 3 x 3 cm bruise just lateral to the right knee and may be a 3 x 3 cm bruise just lateral to the right calf. These are slightly tender to palpation. No real swelling of the legs. Good pulses in both feet. Normal plantar dorsiflexion of both feet. No redness or warmth of the calves. Lymphadenopathy:     She has no cervical adenopathy. Neurological: She is alert and oriented to person, place, and time. She has normal reflexes. No cranial nerve deficit. Coordination normal.   Skin: Skin is warm and dry. No rash noted. No erythema. Psychiatric: She has a normal mood and affect. Her behavior is normal. Judgment and thought content normal.   Nursing note and vitals reviewed. MDM  Number of Diagnoses or Management Options  Diagnosis management comments: Patient has bruising and pain in the right lateral leg. Will check DVT ultrasound. Could be from a bruise from unknown mechanism. Amount and/or Complexity of Data Reviewed  Tests in the radiology section of CPT®: ordered and reviewed  Decide to obtain previous medical records or to obtain history from someone other than the patient: yes  Review and summarize past medical records: yes  Independent visualization of images, tracings, or specimens: yes           Procedures    Doppler is negative    Elevation and ice    Motrin prn    Good return precautions given to patient. Close follow up with PCP recommended. Patient and/or family voices understanding of this plan.  Discharge instructions were explained by me and all concerns were addressed.

## 2019-09-28 NOTE — PROGRESS NOTES
Patient here for discoloration and swelling on right lateral leg. It is tender swollen, bruised with some surrounding redness. No injury. 1st Noted 2 days ago and has seemed to get worse. She was previously on aspirin but stopped as she didn't know if it was a bleeding issue. Associated symptoms include posterior calf pain that has seemed progress toward posterior thigh just above knee and is a sore burning sensation. She has hx of PVD and lymphoma. Is not a smoker and no recent travel. Specifically denies: chest pain, SOB, dizziness, prior hx of DVT/PE           Past Medical History:   Diagnosis Date    Non Hodgkin's lymphoma ([facility] [address]     Stroke ([facility] [address]         No past surgical history on file. No family history on file.      Social History     Socioeconomic History    Marital status:      Spouse name: Not on file    Number of children: Not on file    Years of education: Not on file    Highest education level: Not on file   Occupational History    Not on file   Social Needs    Financial resource strain: Not on file    Food insecurity:     Worry: Not on file     Inability: Not on file    Transportation needs:     Medical: Not on file     Non-medical: Not on file   Tobacco Use    Smoking status: Never Smoker    Smokeless tobacco: Never Used   Substance and Sexual Activity    Alcohol use: Not on file    Drug use: Not on file    Sexual activity: Not on file   Lifestyle    Physical activity:     Days per week: Not on file     Minutes per session: Not on file    Stress: Not on file   Relationships    Social connections:     Talks on phone: Not on file     Gets together: Not on file     Attends Anabaptism service: Not on file     Active member of club or organization: Not on file     Attends meetings of clubs or organizations: Not on file     Relationship status: Not on file    Intimate partner violence:     Fear of current or ex partner: Not on file     Emotionally abused: Not on file Physically abused: Not on file     Forced sexual activity: Not on file   Other Topics Concern    Not on file   Social History Narrative    Not on file                ALLERGIES: Hydrocodone and Iodine    Review of Systems   All other systems reviewed and are negative. Vitals:    09/27/19 1947 09/27/19 1949   BP: 110/58    Pulse: 72    Resp: 18    Temp: 97.6 °F (36.4 °C) 97.9 °F (36.6 °C)   SpO2: 99%    Weight: 189 lb (85.7 kg)    Height: 5' 4.5\" (1.638 m)        Physical Exam   Constitutional: She is oriented to person, place, and time. No distress. Eyes: EOM are normal.   Neck: Normal range of motion. Neck supple. Cardiovascular: Normal rate, regular rhythm and normal heart sounds. Pulmonary/Chest: Effort normal and breath sounds normal. No respiratory distress. She has no wheezes. She has no rales. Musculoskeletal:        Legs:  DP pulses intact. No pedal edema. Good cap refil toes. Lymphadenopathy:     She has no cervical adenopathy. Neurological: She is alert and oriented to person, place, and time. Skin: Skin is warm and dry. She is not diaphoretic. Psychiatric: She has a normal mood and affect. Her behavior is normal. Thought content normal.       MDM     Differential Diagnosis; Clinical Impression; Plan:       CLINICAL IMPRESSION:  (M79.661) Pain of right calf  (primary encounter diagnosis)  (M79.89) Swelling of right lower extremity    Plan:  Advised patient to go immediately to ED r/o thromboembolism. Risk factor hx of lymphoma, PVD.                  Procedures

## 2019-09-28 NOTE — ED NOTES
Patient presents with bruising noted to the lateral aspect of her right knee and thigh. [facility] denies any recent trauma, car rides, flights, smoking and PO birth control. Pt denies chest pain and shortness of breath. No swelling noted. Patient is ambulatory and not in any acute distress.

## 2019-12-23 ENCOUNTER — OFFICE VISIT (OUTPATIENT)
Dept: URGENT CARE | Age: 44
End: 2019-12-23

## 2019-12-23 VITALS
TEMPERATURE: 98.7 F | WEIGHT: 190 LBS | SYSTOLIC BLOOD PRESSURE: 123 MMHG | RESPIRATION RATE: 18 BRPM | HEART RATE: 88 BPM | HEIGHT: 65 IN | BODY MASS INDEX: 31.65 KG/M2 | OXYGEN SATURATION: 97 % | DIASTOLIC BLOOD PRESSURE: 75 MMHG

## 2019-12-23 DIAGNOSIS — J01.10 ACUTE NON-RECURRENT FRONTAL SINUSITIS: Primary | ICD-10-CM

## 2019-12-23 RX ORDER — BENZONATATE 200 MG/1
200 CAPSULE ORAL
Qty: 21 CAP | Refills: 0 | Status: SHIPPED | OUTPATIENT
Start: 2019-12-23 | End: 2019-12-30

## 2019-12-23 RX ORDER — AMOXICILLIN 500 MG/1
500 CAPSULE ORAL 2 TIMES DAILY
Qty: 20 CAP | Refills: 0 | Status: SHIPPED | OUTPATIENT
Start: 2019-12-23 | End: 2020-01-02

## 2019-12-24 NOTE — PROGRESS NOTES
The patient presents with cold symptoms since Thursday. The symptoms started with cough but now with sinus congestion and sore throat. Also with left ear pain. The history is provided by the patient. Cold Symptoms   The history is provided by the patient. This is a new problem. The current episode started more than 2 days ago. The problem occurs constantly. The problem has been rapidly worsening. The cough is non-productive. There has been no fever. Associated symptoms include ear congestion, ear pain, rhinorrhea and sore throat. Pertinent negatives include no chills. She has tried nothing for the symptoms. Past Medical History:   Diagnosis Date    [facility]     Non Hodgkin's lymphoma ([facility]     Stroke [facility])         History reviewed. No pertinent surgical history. History reviewed. No pertinent family history.      Social History     Socioeconomic History    Marital status:      Spouse name: Not on file    Number of children: Not on file    Years of education: Not on file    Highest education level: Not on file   Occupational History    Not on file   Social Needs    Financial resource strain: Not on file    Food insecurity:     Worry: Not on file     Inability: Not on file    Transportation needs:     Medical: Not on file     Non-medical: Not on file   Tobacco Use    Smoking status: Never Smoker    Smokeless tobacco: Never Used   Substance and Sexual Activity    Alcohol use: Never     Frequency: Never    Drug use: Never    Sexual activity: Not on file   Lifestyle    Physical activity:     Days per week: Not on file     Minutes per session: Not on file    Stress: Not on file   Relationships    Social connections:     Talks on phone: Not on file     Gets together: Not on file     Attends Tenriism service: Not on file     Active member of club or organization: Not on file     Attends meetings of clubs or organizations: Not on file     Relationship status: Not on file   [address] [place] [address] Intimate partner violence:     Fear of current or ex partner: Not on file     Emotionally abused: Not on file     Physically abused: Not on file     Forced sexual activity: Not on file   Other Topics Concern    Not on file   Social History Narrative    ** Merged History Encounter **                     ALLERGIES: Hydrocodone; Iodinated contrast media; and Iodine    Review of Systems   Constitutional: Negative for activity change, appetite change, chills and fever. HENT: Positive for ear pain, postnasal drip, rhinorrhea, sinus pressure, sinus pain and sore throat. Respiratory: Positive for cough. Vitals:    12/23/19 1958   BP: 123/75   Pulse: 88   Resp: 18   Temp: 98.7 °F (37.1 °C)   SpO2: 97%   Weight: 190 lb (86.2 kg)   Height: 5' 4.5\" (1.638 m)       Physical Exam  Constitutional:       General: She is not in acute distress. Appearance: She is well-developed. She is not ill-appearing. HENT:      Head: Normocephalic. Right Ear: Tympanic membrane and ear canal normal. No drainage. Tympanic membrane is not erythematous or bulging. Left Ear: Ear canal normal. No drainage. Tympanic membrane is erythematous and bulging. Nose: Congestion and rhinorrhea present. Mouth/Throat:      Pharynx: Posterior oropharyngeal erythema present. No oropharyngeal exudate. Eyes:      Comments: Watery red eyes. Cardiovascular:      Rate and Rhythm: Normal rate and regular rhythm. Heart sounds: Normal heart sounds. Pulmonary:      Effort: Pulmonary effort is normal. No respiratory distress. Breath sounds: Normal breath sounds. No decreased breath sounds or rhonchi. Lymphadenopathy:      Cervical: No cervical adenopathy. Skin:               [facility]    ICD-10-CM ICD-9-CM    1.  Acute non-recurrent frontal sinusitis J01.10 461.1 amoxicillin (AMOXIL) 500 mg capsule      benzonatate (TESSALON) 200 mg capsule     Medications Ordered Today   Medications    amoxicillin (AMOXIL) 500 mg capsule Sig: Take 1 Cap by mouth two (2) times a day for 10 days. Indications: a common cold     Dispense:  20 Cap     Refill:  0    benzonatate (TESSALON) 200 mg capsule     Sig: Take 1 Cap by mouth three (3) times daily as needed for Cough for up to 7 days. Dispense:  21 Cap     Refill:  0     No results found for any visits on 12/23/19. The patients condition was discussed with the patient and they understand. The patient is to follow up with primary care doctor. If signs and symptoms become worse the pt is to go to the ER. The patient is to take medications as prescribed.      Procedures

## 2019-12-24 NOTE — PATIENT INSTRUCTIONS
Sinusitis: Care Instructions Your Care Instructions Sinusitis is an infection of the lining of the sinus cavities in your head. Sinusitis often follows a cold. It causes pain and pressure in your head and face. In most cases, sinusitis gets better on its own in 1 to 2 weeks. But some mild symptoms may last for several weeks. Sometimes antibiotics are needed. Follow-up care is a key part of your treatment and safety. Be sure to make and go to all appointments, and call your doctor if you are having problems. It's also a good idea to know your test results and keep a list of the medicines you take. How can you care for yourself at home? · Take an over-the-counter pain medicine, such as acetaminophen (Tylenol), ibuprofen (Advil, Motrin), or naproxen (Aleve). Read and follow all instructions on the label. · If the doctor prescribed antibiotics, take them as directed. Do not stop taking them just because you feel better. You need to take the full course of antibiotics. · Be careful when taking over-the-counter cold or flu medicines and Tylenol at the same time. Many of these medicines have acetaminophen, which is Tylenol. Read the labels to make sure that you are not taking more than the recommended dose. Too much acetaminophen (Tylenol) can be harmful. · Breathe warm, moist air from a steamy shower, a hot bath, or a sink filled with hot water. Avoid cold, dry air. Using a humidifier in your home may help. Follow the directions for cleaning the machine. · Use saline (saltwater) nasal washes to help keep your nasal passages open and wash out mucus and bacteria. You can buy saline nose drops at a grocery store or drugstore. Or you can make your own at home by adding 1 teaspoon of salt and 1 teaspoon of baking soda to 2 cups of distilled water. If you make your own, fill a bulb syringe with the solution, insert the tip into your nostril, and squeeze gently. Liliana Easter your nose. · Put a hot, wet towel or a warm gel pack on your face 3 or 4 times a day for 5 to 10 minutes each time. · Try a decongestant nasal spray like oxymetazoline (Afrin). Do not use it for more than 3 days in a row. Using it for more than 3 days can make your congestion worse. When should you call for help? Call your doctor now or seek immediate medical care if: 
  · You have new or worse swelling or redness in your face or around your eyes.  
  · You have a new or higher fever.  
 Watch closely for changes in your health, and be sure to contact your doctor if: 
  · You have new or worse facial pain.  
  · The mucus from your nose becomes thicker (like pus) or has new blood in it.  
  · You are not getting better as expected. Where can you learn more? Go to http://bing-willis.info/. Enter C982 in the search box to learn more about \"Sinusitis: Care Instructions. \" Current as of: October 21, 2018 Content Version: 12.2 © 6000-9714 [employer]. Care instructions adapted under license by [employer] (which disclaims liability or warranty for this information). If you have questions about a medical condition or this instruction, always ask your healthcare professional. [address] any warranty or liability for your use of this information.

## 2020-02-03 ENCOUNTER — OFFICE VISIT (OUTPATIENT)
Dept: URGENT CARE | Age: 45
End: 2020-02-03

## 2020-02-03 VITALS
HEART RATE: 79 BPM | OXYGEN SATURATION: 98 % | DIASTOLIC BLOOD PRESSURE: 64 MMHG | HEIGHT: 64 IN | TEMPERATURE: 98 F | RESPIRATION RATE: 16 BRPM | SYSTOLIC BLOOD PRESSURE: 163 MMHG | BODY MASS INDEX: 32.44 KG/M2 | WEIGHT: 190 LBS

## 2020-02-03 DIAGNOSIS — R05.9 COUGH: Primary | ICD-10-CM

## 2020-02-03 DIAGNOSIS — R68.89 FLU-LIKE SYMPTOMS: ICD-10-CM

## 2020-02-03 DIAGNOSIS — J02.9 SORE THROAT: ICD-10-CM

## 2020-02-03 LAB
FLUAV+FLUBV AG NOSE QL IA.RAPID: NEGATIVE POS/NEG
FLUAV+FLUBV AG NOSE QL IA.RAPID: NEGATIVE POS/NEG
VALID INTERNAL CONTROL?: YES

## 2020-02-03 RX ORDER — OSELTAMIVIR PHOSPHATE 75 MG/1
75 CAPSULE ORAL 2 TIMES DAILY
Qty: 10 CAP | Refills: 0 | Status: SHIPPED | OUTPATIENT
Start: 2020-02-03 | End: 2020-02-08

## 2020-02-03 RX ORDER — BENZONATATE 200 MG/1
200 CAPSULE ORAL
Qty: 30 CAP | Refills: 0 | Status: SHIPPED | OUTPATIENT
Start: 2020-02-03 | End: 2020-10-08

## 2020-02-03 RX ORDER — DEXAMETHASONE SODIUM PHOSPHATE 100 MG/10ML
10 INJECTION INTRAMUSCULAR; INTRAVENOUS ONCE
Status: COMPLETED | OUTPATIENT
Start: 2020-02-03 | End: 2020-02-03

## 2020-02-03 RX ADMIN — DEXAMETHASONE SODIUM PHOSPHATE 10 MG: 100 INJECTION INTRAMUSCULAR; INTRAVENOUS at 21:13

## 2020-02-04 NOTE — PATIENT INSTRUCTIONS

## 2020-02-04 NOTE — PROGRESS NOTES
The history is provided by the patient. Cold Symptoms   The history is provided by the patient. This is a new problem. Episode onset: today. The problem occurs constantly. The problem has not changed since onset. The cough is non-productive. There has been no fever. Associated symptoms include chills and sore throat. Pertinent negatives include no chest pain, no sweats, no headaches, no rhinorrhea, no shortness of breath, no wheezing, no nausea and no vomiting. Associated symptoms comments: Body aches. Past Medical History:   Diagnosis Date    Lymphoma ([facility]     Non Hodgkin's lymphoma ([facility]     Stroke [facility])         History reviewed. No pertinent surgical history. History reviewed. No pertinent family history.      Social History     Socioeconomic History    Marital status:      Spouse name: Not on file    Number of children: Not on file    Years of education: Not on file    Highest education level: Not on file   Occupational History    Not on file   Social Needs    Financial resource strain: Not on file    Food insecurity:     Worry: Not on file     Inability: Not on file    Transportation needs:     Medical: Not on file     Non-medical: Not on file   Tobacco Use    Smoking status: Never Smoker    Smokeless tobacco: Never Used   Substance and Sexual Activity    Alcohol use: Never     Frequency: Never    Drug use: Never    Sexual activity: Not on file   Lifestyle    Physical activity:     Days per week: Not on file     Minutes per session: Not on file    Stress: Not on file   Relationships    Social connections:     Talks on phone: Not on file     Gets together: Not on file     Attends Christianity service: Not on file     Active member of club or organization: Not on file     Attends meetings of clubs or organizations: Not on file     Relationship status: Not on file    Intimate partner violence:     Fear of current or ex partner: Not on file     Emotionally abused: Not on file Physically abused: Not on file     Forced sexual activity: Not on file   Other Topics Concern    Not on file   Social History Narrative    ** Merged History Encounter **                     ALLERGIES: Hydrocodone; Iodinated contrast media; and Iodine    Review of Systems   Constitutional: Positive for chills, fatigue and fever. HENT: Positive for congestion, postnasal drip and sore throat. Negative for rhinorrhea, sinus pressure, sinus pain, trouble swallowing and voice change. Respiratory: Positive for cough. Negative for chest tightness, shortness of breath and wheezing. Cardiovascular: Negative for chest pain. Gastrointestinal: Negative for nausea and vomiting. Skin: Negative. Neurological: Negative. Negative for headaches. Vitals:    02/03/20 2052   BP: 163/64   Pulse: 79   Resp: 16   Temp: 98 °F (36.7 °C)   SpO2: 98%   Weight: 190 lb (86.2 kg)   Height: 5' 4\" (1.626 m)       Physical Exam  Constitutional:       Appearance: She is well-developed. HENT:      Nose: Congestion and rhinorrhea present. Mouth/Throat:      Pharynx: No oropharyngeal exudate or posterior oropharyngeal erythema. Neck:      Musculoskeletal: Normal range of motion. Cardiovascular:      Rate and Rhythm: Normal rate and regular rhythm. Heart sounds: Normal heart sounds. Pulmonary:      Effort: Pulmonary effort is normal.      Breath sounds: Normal breath sounds. Abdominal:      General: Bowel sounds are normal.      Palpations: Abdomen is soft. Musculoskeletal: Normal range of motion. Lymphadenopathy:      Cervical: No cervical adenopathy. Skin:     General: Skin is warm and dry. Neurological:      Mental Status: She is alert and oriented to person, place, and time.          MDM     Differential Diagnosis; Clinical Impression; Plan:     (R05) Cough  (primary encounter diagnosis)  (R68.89) Flu-like symptoms  (J02.9) Sore throat  Orders Placed This Encounter      oseltamivir (TAMIFLU) 75 mg capsule          Sig: Take 1 Cap by mouth two (2) times a day for 5 days. Dispense:  10 Cap          Refill:  0      dexamethasone (DECADRON) Oral 10 mg      benzonatate (TESSALON) 200 mg capsule          Sig: Take 1 Cap by mouth three (3) times daily as needed for Cough. Dispense:  30 Cap          Refill:  0    Advised to increase fluid intake. Take ibuprofen or tylenol for discomfort. Will treat for influenza due to symptoms and  tested positive for influenza 3 days ago. The patients condition was discussed with the patient and they understand. The patient is to follow up with PCP. If signs and symptoms become worse the pt is to go to the ER. The patient is to take medications as prescribed. AVS given with patient instructions upon discharge.                   Procedures

## 2020-09-15 ENCOUNTER — APPOINTMENT (OUTPATIENT)
Dept: GENERAL RADIOLOGY | Age: 45
End: 2020-09-15
Attending: PHYSICIAN ASSISTANT
Payer: MEDICAID

## 2020-09-15 ENCOUNTER — HOSPITAL ENCOUNTER (EMERGENCY)
Age: 45
Discharge: HOME | End: 2020-09-15
Attending: EMERGENCY MEDICINE
Payer: MEDICAID

## 2020-09-15 VITALS
TEMPERATURE: 97.9 F | WEIGHT: 190 LBS | OXYGEN SATURATION: 99 % | HEART RATE: 74 BPM | RESPIRATION RATE: 16 BRPM | HEIGHT: 64 IN | DIASTOLIC BLOOD PRESSURE: 81 MMHG | SYSTOLIC BLOOD PRESSURE: 129 MMHG | BODY MASS INDEX: 32.44 KG/M2

## 2020-09-15 DIAGNOSIS — K64.9 HEMORRHOIDS, UNSPECIFIED HEMORRHOID TYPE: ICD-10-CM

## 2020-09-15 DIAGNOSIS — K56.41 FECAL IMPACTION (HCC): ICD-10-CM

## 2020-09-15 DIAGNOSIS — K59.00 CONSTIPATION, UNSPECIFIED CONSTIPATION TYPE: Primary | ICD-10-CM

## 2020-09-15 PROCEDURE — 74018 RADEX ABDOMEN 1 VIEW: CPT

## 2020-09-15 PROCEDURE — 99285 EMERGENCY DEPT VISIT HI MDM: CPT

## 2020-09-15 PROCEDURE — 74011250637 HC RX REV CODE- 250/637: Performed by: PHYSICIAN ASSISTANT

## 2020-09-15 PROCEDURE — 74011250636 HC RX REV CODE- 250/636: Performed by: PHYSICIAN ASSISTANT

## 2020-09-15 PROCEDURE — 96372 THER/PROPH/DIAG INJ SC/IM: CPT

## 2020-09-15 RX ORDER — FENTANYL CITRATE 50 UG/ML
50 INJECTION, SOLUTION INTRAMUSCULAR; INTRAVENOUS ONCE
Status: COMPLETED | OUTPATIENT
Start: 2020-09-15 | End: 2020-09-15

## 2020-09-15 RX ORDER — GLYCERIN, LIDOCAINE, PETROLATUM, AND PHENYLEPHRINE HYDROCHLORIDE 144; 50; 150; 2.5 MG/G; MG/G; MG/G; MG/G
1 CREAM TOPICAL EVERY 6 HOURS
Qty: 28 G | Refills: 0 | Status: SHIPPED | OUTPATIENT
Start: 2020-09-15 | End: 2020-10-08

## 2020-09-15 RX ORDER — DEXTROMETHORPHAN POLISTIREX 30 MG/5 ML
SUSPENSION, EXTENDED RELEASE 12 HR ORAL
Status: COMPLETED | OUTPATIENT
Start: 2020-09-15 | End: 2020-09-15

## 2020-09-15 RX ORDER — MAGNESIUM CITRATE
296 SOLUTION, ORAL ORAL
Qty: 1 BOTTLE | Refills: 0 | Status: SHIPPED | OUTPATIENT
Start: 2020-09-15 | End: 2020-09-15

## 2020-09-15 RX ADMIN — MINERAL OIL: 100 ENEMA RECTAL at 13:41

## 2020-09-15 RX ADMIN — FENTANYL CITRATE 50 MCG: 50 INJECTION, SOLUTION INTRAMUSCULAR; INTRAVENOUS at 11:40

## 2020-09-15 NOTE — ED NOTES
Pt was discharged per physician. Received discharge instructions and medications scripts. Pt understood plan of care and medication regimen. Pt had all belongings and escorted to transportation.

## 2020-09-15 NOTE — ED NOTES
Patient came in with complaints of abdominal pain and constipation. She states the pain is 10 on PS. She also states she is menstruating. She stated this started 2 days ago, her stool felt hard and hard to pass. She has a BM daily with no issues. No dietary or fluid changes. She said she drinks enough water daily.  No fever or any other complaints

## 2020-09-15 NOTE — DISCHARGE INSTRUCTIONS
Patient Education        Constipation: Care Instructions  Your Care Instructions     Constipation means that you have a hard time passing stools (bowel movements). People pass stools from 3 times a day to once every 3 days. What is normal for you may be different. Constipation may occur with pain in the rectum and cramping. The pain may get worse when you try to pass stools. Sometimes there are small amounts of bright red blood on toilet paper or the surface of stools. This is because of enlarged veins near the rectum (hemorrhoids). A few changes in your diet and lifestyle may help you avoid ongoing constipation. Your doctor may also prescribe medicine to help loosen your stool. Some medicines can cause constipation. These include pain medicines and antidepressants. Tell your doctor about all the medicines you take. Your doctor may want to make a medicine change to ease your symptoms. Follow-up care is a key part of your treatment and safety. Be sure to make and go to all appointments, and call your doctor if you are having problems. It's also a good idea to know your test results and keep a list of the medicines you take. How can you care for yourself at home? · Drink plenty of fluids, enough so that your urine is light yellow or clear like water. If you have kidney, heart, or liver disease and have to limit fluids, talk with your doctor before you increase the amount of fluids you drink. · Include high-fiber foods in your diet each day. These include fruits, vegetables, beans, and whole grains. · Get at least 30 minutes of exercise on most days of the week. Walking is a good choice. You also may want to do other activities, such as running, swimming, cycling, or playing tennis or team sports. · Take a fiber supplement, such as Citrucel or Metamucil, every day. Read and follow all instructions on the label. · Schedule time each day for a bowel movement. A daily routine may help.  Take your time having your bowel movement. · Support your feet with a small step stool when you sit on the toilet. This helps flex your hips and places your pelvis in a squatting position. · Your doctor may recommend an over-the-counter laxative to relieve your constipation. Examples are Milk of Magnesia and MiraLax. Read and follow all instructions on the label. Do not use laxatives on a long-term basis. When should you call for help? Call your doctor now or seek immediate medical care if:    · You have new or worse belly pain.     · You have new or worse nausea or vomiting.     · You have blood in your stools. Watch closely for changes in your health, and be sure to contact your doctor if:    · Your constipation is getting worse.     · You do not get better as expected. Where can you learn more? Go to http://bing-willis.info/  Enter P343 in the search box to learn more about \"Constipation: Care Instructions. \"  Current as of: June 26, 2019               Content Version: 12.6  © 5950-5855 [employer]. Care instructions adapted under license by [employer] (which disclaims liability or warranty for this information). If you have questions about a medical condition or this instruction, always ask your healthcare professional. [place] [address] any warranty or liability for your use of this information. Patient Education        Hemorrhoids: Care Instructions  Your Care Instructions     Hemorrhoids are enlarged veins that develop in the anal canal. Bleeding during bowel movements, itching, swelling, and rectal pain are the most common symptoms. They can be uncomfortable at times, but hemorrhoids rarely are a serious problem. You can treat most hemorrhoids with simple changes to your diet and bowel habits. These changes include eating more fiber and not straining to pass stools.  Most hemorrhoids do not need surgery or other treatment unless they are very large and painful or bleed a lot. Follow-up care is a key part of your treatment and safety. Be sure to make and go to all appointments, and call your doctor if you are having problems. It's also a good idea to know your test results and keep a list of the medicines you take. How can you care for yourself at home? · Sit in a few inches of warm water (sitz bath) 3 times a day and after bowel movements. The warm water helps with pain and itching. · Put ice on your anal area several times a day for 10 minutes at a time. Put a thin cloth between the ice and your skin. Follow this by placing a warm, wet towel on the area for another 10 to 20 minutes. · Take pain medicines exactly as directed. ? If the doctor gave you a prescription medicine for pain, take it as prescribed. ? If you are not taking a prescription pain medicine, ask your doctor if you can take an over-the-counter medicine. · Keep the anal area clean, but be gentle. Use water and a fragrance-free soap, such as [place], or use baby wipes or medicated pads, such as Tucks. · Wear cotton underwear and loose clothing to decrease moisture in the anal area. · Eat more fiber. Include foods such as whole-grain breads and cereals, raw vegetables, raw and dried fruits, and beans. · Drink plenty of fluids, enough so that your urine is light yellow or clear like water. If you have kidney, heart, or liver disease and have to limit fluids, talk with your doctor before you increase the amount of fluids you drink. · Use a stool softener that contains bran or psyllium. You can save money by buying bran or psyllium (available in bulk at most health food stores) and sprinkling it on foods or stirring it into fruit juice. Or you can use a product such as Metamucil or Hydrocil. · Practice healthy bowel habits. ? Go to the bathroom as soon as you have the urge. ? Avoid straining to pass stools. Relax and give yourself time to let things happen naturally.   ? Do not hold your breath while passing stools. ? Do not read while sitting on the toilet. Get off the toilet as soon as you have finished. · Take your medicines exactly as prescribed. Call your doctor if you think you are having a problem with your medicine. When should you call for help? Call 911 anytime you think you may need emergency care. For example, call if:    · You pass maroon or very bloody stools. Call your doctor now or seek immediate medical care if:    · You have increased pain.     · You have increased bleeding. Watch closely for changes in your health, and be sure to contact your doctor if:    · Your symptoms have not improved after 3 or 4 days. Where can you learn more? Go to http://bing-willis.info/  Enter F228 in the search box to learn more about \"Hemorrhoids: Care Instructions. \"  Current as of: April 15, 2020               Content Version: 12.6  © 9082-9937 [employer]. Care instructions adapted under license by [employer] (which disclaims liability or warranty for this information). If you have questions about a medical condition or this instruction, always ask your healthcare professional. [place] [address] any warranty or liability for your use of this information.

## 2020-09-15 NOTE — ED PROVIDER NOTES
EMERGENCY DEPARTMENT HISTORY AND PHYSICAL EXAM      Date: 9/15/2020  Patient Name: Gonzales Smith    Please note that this dictation was completed with [employer], the computer voice recognition software. Quite often unanticipated grammatical, syntax, homophones, and other interpretive errors are inadvertently transcribed by the computer software. Please disregard these errors. Please excuse any errors that have escaped final proofreading. History of Presenting Illness     Chief Complaint   Patient presents with    Constipation     Patient reports she has been constipated for 2 days. She reports some bleeding       History Provided By: Patient    HPI: Gonzales Smith, 50 y.o. female with PMHx significant for reflux and lymphoma, presents via EMS to the ED with cc of constipation x3 days. Patient reports that her last bowel movement was 3 days ago and it was very small and hard and difficult to pass. She reports she has been unable to pass stool since then. She has been able to pass gas. She attempted to eat Markos seeds to improve her symptoms but that has not worked. She also attempted to disimpact herself at home and she can feel a tough plug but she was unable to clear the entire fecal burden. She denies any abdominal pain, nausea, vomiting, chest pain, shortness of breath. PCP: Reynaldo Johnston MD    There are no other complaints, changes, or physical findings at this time. Current Outpatient Medications   Medication Sig Dispense Refill    lidocaine-phenyl-glycer-petrol (Preparation H Rapid Rlf-Lidocn) 5-0.25-14.4-15 % crea 1 g by Apply Externally route every six (6) hours. 28 g 0    magnesium citrate solution Take 296 mL by mouth now for 1 dose. 1 Bottle 0    benzonatate (TESSALON) 200 mg capsule Take 1 Cap by mouth three (3) times daily as needed for Cough. 30 Cap 0    fluconazole (DIFLUCAN) 150 mg tablet Take 150 mg by mouth every seven (7) days.  FDA advises cautious prescribing of oral fluconazole in pregnancy.  atorvastatin (LIPITOR) 10 mg tablet Take  by mouth daily.  fexofenadine (ALLEGRA) 180 mg tablet Take  by mouth.  acyclovir (ZOVIRAX) 800 mg tablet Take 1,000 mg by mouth daily.  hydrOXYzine HCl (ATARAX) 25 mg tablet Take 1 Tab by mouth every eight (8) hours as needed for Anxiety. 12 Tab 0    cetirizine (ZYRTEC) 10 mg tablet Take 10 mg by mouth daily.  pantoprazole (PROTONIX) 40 mg tablet Take 40 mg by mouth daily as needed.  ascorbic acid (VITAMIN C) 500 mg tablet Take 500 mg by mouth daily.  sertraline (ZOLOFT) 50 mg tablet Take 100 mg by mouth daily.  aspirin 81 mg chewable tablet Take 81 mg by mouth daily. Past History     Past Medical History:  Past Medical History:   Diagnosis Date    [facility] [address]     Non Hodgkin's lymphoma ([facility] [address] [facility]     Stroke [facility])        Past Surgical History:  History reviewed. No pertinent surgical history. Family History:  History reviewed. No pertinent family history. Social History:  Social History     Tobacco Use    Smoking status: Never Smoker    Smokeless tobacco: Never Used   Substance Use Topics    Alcohol use: Never     Frequency: Never    Drug use: Never       Allergies: Allergies   Allergen Reactions    Hydrocodone Nausea and Vomiting    Iodinated Contrast Media Hives    Iodine Hives         Review of Systems   Review of Systems   Constitutional: Negative. Negative for chills and fever. Eyes: Negative for pain and visual disturbance. Respiratory: Negative for cough and shortness of breath. Cardiovascular: Negative for chest pain and palpitations. Gastrointestinal: Positive for constipation. Negative for abdominal pain, nausea and vomiting. Genitourinary: Negative for dysuria and hematuria. Musculoskeletal: Negative for arthralgias and myalgias. Skin: Negative for color change, rash and wound. Neurological: Negative for numbness and headaches.    All other systems reviewed and are negative. Physical Exam   Physical Exam  Vitals signs and nursing note reviewed. Constitutional:       General: She is not in acute distress. Appearance: She is well-developed. She is not diaphoretic. Comments: 50 y.o. female in NAD  Communicates appropriately and in full sentences   HENT:      Head: Normocephalic and atraumatic. Eyes:      General:         Right eye: No discharge. Left eye: No discharge. Conjunctiva/sclera: Conjunctivae normal.      Pupils: Pupils are equal, round, and reactive to light. Neck:      Musculoskeletal: Normal range of motion and neck supple. Comments: No nuchal rigidity  Cardiovascular:      Rate and Rhythm: Normal rate and regular rhythm. Pulses: Normal pulses. Heart sounds: Normal heart sounds. Pulmonary:      Effort: Pulmonary effort is normal. No respiratory distress. Breath sounds: Normal breath sounds. No wheezing. Abdominal:      General: Abdomen is flat. There is no distension. Palpations: Abdomen is soft. Tenderness: There is no abdominal tenderness. Genitourinary:     Rectum: External hemorrhoid and internal hemorrhoid present. Comments: Hard stool plug at the rectum. Internal hemorrhoids and external hemorrhoids visualized and seen. Musculoskeletal: Normal range of motion. General: No tenderness or deformity. Comments: No neurologic or vascular compromise on exam.    Skin:     General: Skin is warm and dry. Coloration: Skin is not pale. Findings: No erythema or rash. Neurological:      Mental Status: She is alert and oriented to person, place, and time. Coordination: Coordination normal.           Diagnostic Study Results     Labs -   No results found for this or any previous visit (from the past 12 hour(s)). Radiologic Studies -   XR ABD PORT  1 V   Final Result   IMPRESSION: No evidence for bowel obstruction or other acute abdominal findings. Medical Decision Making   I am the first provider for this patient. I reviewed the vital signs, available nursing notes, past medical history, past surgical history, family history and social history. Vital Signs-Reviewed the patient's vital signs. Patient Vitals for the past 12 hrs:   Temp Pulse Resp BP SpO2   09/15/20 1400    129/81 99 %   09/15/20 1345    127/83 100 %   09/15/20 1330    122/84 98 %   09/15/20 1315    118/87 100 %   09/15/20 1300    127/85 98 %   09/15/20 1245    134/84 96 %   09/15/20 1230    (!) 131/93 98 %   09/15/20 1215    (!) 136/94 100 %   09/15/20 1200    (!) 131/90 100 %   09/15/20 1146     100 %   09/15/20 1145    137/80 100 %   09/15/20 1124 97.9 °F (36.6 °C) 74 16 128/86 100 %   09/15/20 1111 98.2 °F (36.8 °C) 80 20 125/78 100 %         Records Reviewed: Nursing Notes and Old Medical Records    Provider Notes (Medical Decision Making):   Differential diagnosis includes fecal impaction, constipation, dehydration. 68-year-old female presents with rectal pain and difficulty passing stool for the last 3 days. She does not drink much water in her diet does not consist of much fiber. On exam, there is an external hemorrhoid which is nonthrombosed. There is a firm stool plug at the rectum. Will attempt to disimpact and also use enemas as needed. Will discharge with close GI follow-up. Patient goes to Dr. Delfino Pruitt at [facility]. ED Course:   Initial assessment performed. The patients presenting problems have been discussed, and they are in agreement with the care plan formulated and outlined with them. I have encouraged them to ask questions as they arise throughout their visit. ED Course as of Sep 15 1706   Tue Sep 15, 2020   1216 Pt currently on the commode    [GC]   1307 Pt on the commode again after attempting mineral oil enema    [GC]   1349 Pt passed a large amount of stool in bedside commode.  She is reporting chills. Denies abdominal pain, but deoes report rectal pain. [place] provided. Ordered KUB. [GC]      ED Course User Index  [GC] Alessio Muro [place]        Procedure Note - Manual Disimpaction:   Performed by: Radha Pulido PA-C   Patient was manually disimpacted. A large amount of brown stool was successfully removed. The procedure took 16-30 minutes, and pt tolerated well. DISCHARGE NOTE:  James Aldana's  results have been reviewed with her. She has been counseled regarding her diagnosis. She verbally conveys understanding and agreement of the signs, symptoms, diagnosis, treatment and prognosis and additionally agrees to follow up as recommended with Dr. Radha Smith MD in 24 - 48 hours. She also agrees with the care-plan and conveys that all of her questions have been answered. I have also put together some discharge instructions for her that include: 1) educational information regarding their diagnosis, 2) how to care for their diagnosis at home, as well a 3) list of reasons why they would want to return to the ED prior to their follow-up appointment, should their condition change. She and/or family's questions have been answered. I have encouraged them to see the official results in [facility]\" or to retrieve the specifics of their results from medical records. PLAN:  1. Return precautions as discussed  2. Follow-up with providers as directed  3. Medications as prescribed    Return to ED if worse     Diagnosis     Clinical Impression:   1. Constipation, unspecified constipation type    2. Fecal impaction ([facility] [address]    3. Hemorrhoids, unspecified hemorrhoid type        Discharge Medication List as of 9/15/2020  3:29 PM      START taking these medications    Details   lidocaine-phenyl-glycer-petrol (Preparation H Rapid Rlf-Lidocn) 5-0.25-14.4-15 % crea 1 g by Apply Externally route every six (6) hours. , Print, Disp-28 g,R-0      magnesium citrate solution Take 296 mL by mouth now for 1 dose., Print, Disp-1 Bottle,R-0         CONTINUE these medications which have NOT CHANGED    Details   benzonatate (TESSALON) 200 mg capsule Take 1 Cap by mouth three (3) times daily as needed for Cough. , Print, Disp-30 Cap, R-0      fluconazole (DIFLUCAN) 150 mg tablet Take 150 mg by mouth every seven (7) days. FDA advises cautious prescribing of oral fluconazole in pregnancy. , Historical Med      atorvastatin (LIPITOR) 10 mg tablet Take  by mouth daily. , Historical Med      fexofenadine (ALLEGRA) 180 mg tablet Take  by mouth., Historical Med      acyclovir (ZOVIRAX) 800 mg tablet Take 1,000 mg by mouth daily. , Historical Med      hydrOXYzine HCl (ATARAX) 25 mg tablet Take 1 Tab by mouth every eight (8) hours as needed for Anxiety., Normal, Disp-12 Tab, R-0      cetirizine (ZYRTEC) 10 mg tablet Take 10 mg by mouth daily. , Historical Med      pantoprazole (PROTONIX) 40 mg tablet Take 40 mg by mouth daily as needed., Historical Med      ascorbic acid (VITAMIN C) 500 mg tablet Take 500 mg by mouth daily. , Historical Med      sertraline (ZOLOFT) 50 mg tablet Take 100 mg by mouth daily. , Historical Med      aspirin 81 mg chewable tablet Take 81 mg by mouth daily. , Historical Med             Follow-up Information     Follow up With Specialties Details Why Contact Info    Shelby Flores MD Colon and Rectal Surgery Call If symptoms worsen, Possible further evaluation and treatment [id number] [facility] [address]  [phone]          6:05 AM  I was personally available for consultation in the emergency department. I have reviewed the chart and agree with the documentation recorded by the KRISTYN, including the assessment, treatment plan, and disposition. Devaughn Claude, MD        This note will not be viewable in [address]

## 2020-10-08 ENCOUNTER — OFFICE VISIT (OUTPATIENT)
Dept: URGENT CARE | Age: 45
End: 2020-10-08
Payer: MEDICAID

## 2020-10-08 VITALS — HEART RATE: 74 BPM | TEMPERATURE: 99 F | RESPIRATION RATE: 14 BRPM | OXYGEN SATURATION: 98 %

## 2020-10-08 DIAGNOSIS — R05.9 COUGH: ICD-10-CM

## 2020-10-08 DIAGNOSIS — R52 BODY ACHES: Primary | ICD-10-CM

## 2020-10-08 DIAGNOSIS — K30 UPSET STOMACH: ICD-10-CM

## 2020-10-08 DIAGNOSIS — R09.81 NASAL CONGESTION: ICD-10-CM

## 2020-10-08 PROCEDURE — 99213 OFFICE O/P EST LOW 20 MIN: CPT | Performed by: NURSE PRACTITIONER

## 2020-10-08 RX ORDER — OMEPRAZOLE 20 MG/1
20 CAPSULE, DELAYED RELEASE ORAL DAILY
COMMUNITY

## 2020-10-08 RX ORDER — RANITIDINE 150 MG/1
150 TABLET, FILM COATED ORAL 2 TIMES DAILY
COMMUNITY
End: 2022-02-09

## 2020-10-08 RX ORDER — ALBUTEROL SULFATE 90 UG/1
2 AEROSOL, METERED RESPIRATORY (INHALATION)
Qty: 1 INHALER | Refills: 0 | Status: SHIPPED | OUTPATIENT
Start: 2020-10-08

## 2020-10-09 NOTE — PROGRESS NOTES
Subjective: (As above and below)       This patient was seen in [facility] at [address] [place] [address] Urgent Care while outdoors at their vehicle due to COVID-19 pandemic with PPE and focused examination in order to decrease community viral transmission. The patient/guardian gave verbal consent to treat. Chief Complaint   Patient presents with    Covid Testing     chills bodyaches last night now h/a and congestion     Mariel Wilkerson is a 50 y.o. female who presents for evaluation of : sudden onset of chills, nasal congestion, upset stomach. Symptom onset 1 days ago. No vomiting or loose stool. Preceding illness: none. Was referred here for covid 19 testing. Has tried no meds. No other identified aggravating or alleviating factors. Symptoms are constant and overall unchanged. Promotes no decrease in PO intake of fluids. Denies: severe lethargy, SOB, syncope/near syncope, vomiting/diarrhea, chest pain, chest pain with breathing, labored breathing, severe headache, non-intractable fevers . Recent travel: no  Known Exposure to COVID-19: no  Known flu or strep contact: no     Review of Systems - negative except as listed above    Reviewed PmHx, RxHx, FmHx, SocHx, AllgHx and updated in chart. History reviewed. No pertinent family history.     Past Medical History:   Diagnosis Date    Lymphoma ([facility] [address]     Non Hodgkin's lymphoma ([facility] [address]     [facility]       Social History     Socioeconomic History    Marital status:      Spouse name: Not on file    Number of children: Not on file    Years of education: Not on file    Highest education level: Not on file   Tobacco Use    Smoking status: Never Smoker    Smokeless tobacco: Never Used   Substance and Sexual Activity    Alcohol use: Never     Frequency: Never    Drug use: Never   Social History Narrative    ** Merged History Encounter **               Current Outpatient Medications   Medication Sig    omeprazole (PRILOSEC) 20 mg capsule Take 20 mg by mouth daily.  raNITIdine (Zantac) 150 mg tablet Take 150 mg by mouth two (2) times a day.  albuterol (PROVENTIL HFA, VENTOLIN HFA, PROAIR HFA) 90 mcg/actuation inhaler Take 2 Puffs by inhalation every four (4) hours as needed for Wheezing.  fluconazole (DIFLUCAN) 150 mg tablet Take 150 mg by mouth every seven (7) days. FDA advises cautious prescribing of oral fluconazole in pregnancy.  atorvastatin (LIPITOR) 10 mg tablet Take  by mouth daily.  acyclovir (ZOVIRAX) 800 mg tablet Take 1,000 mg by mouth daily.  ascorbic acid (VITAMIN C) 500 mg tablet Take 500 mg by mouth daily.  sertraline (ZOLOFT) 50 mg tablet Take 100 mg by mouth daily. No current facility-administered medications for this visit. Objective:     Vitals:    10/08/20 1947   Pulse: 74   Resp: 14   Temp: 99 °F (37.2 °C)   SpO2: 98%       Physical Exam  General appearance - appears well hydrated and does not appear toxic, no acute distress  Eyes - EOMs intact. Non injected. No scleral icterus   Ears - no external swelling  Nose - nasal sniffling. No purulent drainage  Mouth - OP mild erythema without swelling, exudate or lesion. Mucus membranes moist. Uvula midline. Neck/Lymphatics - trachea midline, full AROM  Chest - normal breathing effort no wheeze rales or rhonchi. Heart - RRR no murmurs. Skin - no observable rashes or pallor  Neurologic- alert and oriented x 3  Psychiatric- normal mood, behavior and though content. Assessment/ Plan:     1. Body aches  Differential diagnosis includes: viral illness, covid 19  No evidence of complication of illness at this time. Supportive home care- maintain adequate fluid intake, lozenges, over the counter Tylenol.   Patient is aware that the differential includes COVID-19 and was advised the following: self isolation based on current CDC guidelines, avoiding high risk individuals, washing hands frequently, avoid touching face, eyes or mucus membranes, wearing surgical mask if they are in public to reduce transmission to others.  - NOVEL CORONAVIRUS (COVID-19)    2. Nasal congestion    - NOVEL CORONAVIRUS (COVID-19)    3. Upset stomach    - NOVEL CORONAVIRUS (COVID-19)    4. Cough  Mild cough. Requests refill of inhaler. No SOB. Monitor closely.  - NOVEL CORONAVIRUS (COVID-19)  - albuterol (PROVENTIL HFA, VENTOLIN HFA, PROAIR HFA) 90 mcg/actuation inhaler; Take 2 Puffs by inhalation every four (4) hours as needed for Wheezing. Dispense: 1 Inhaler; Refill: 0          Follow up: We have reviewed, in detail, particular presentations/worsening/concerning signs and symptoms that may warrant seeking immediate care in the ED setting and patient verbalized being aware of what to watch for. For other non-severe changes, non-improvement or questions, patient aware to contact PCP office or consider a virtual online medical consultation. Reviewed with her that COVID-19 pandemic is an evolving situation with rapidly changing recommendations & guidelines. Medical decisions are made based on the the best information available at the time.   Recommended she stay tuned for updates published by trusted sources and to advise your PCP of any unexpected changes in clinical condition     Lili Conroy NP

## 2020-10-12 LAB — SARS-COV-2, NAA: NOT DETECTED

## 2021-05-18 ENCOUNTER — OFFICE VISIT (OUTPATIENT)
Dept: URGENT CARE | Age: 46
End: 2021-05-18
Payer: MEDICAID

## 2021-05-18 VITALS — HEART RATE: 81 BPM | TEMPERATURE: 97.8 F | RESPIRATION RATE: 16 BRPM | OXYGEN SATURATION: 95 %

## 2021-05-18 DIAGNOSIS — Z20.822 ENCOUNTER FOR LABORATORY TESTING FOR COVID-19 VIRUS: Primary | ICD-10-CM

## 2021-05-18 PROCEDURE — 99211 OFF/OP EST MAY X REQ PHY/QHP: CPT | Performed by: NURSE PRACTITIONER

## 2021-05-20 LAB
SARS-COV-2, NAA 2 DAY TAT: NORMAL
SARS-COV-2, NAA: NOT DETECTED

## 2021-10-04 ENCOUNTER — OFFICE VISIT (OUTPATIENT)
Dept: URGENT CARE | Age: 46
End: 2021-10-04
Payer: MEDICAID

## 2021-10-04 VITALS — OXYGEN SATURATION: 97 % | HEART RATE: 73 BPM | TEMPERATURE: 98 F | RESPIRATION RATE: 16 BRPM

## 2021-10-04 DIAGNOSIS — Z20.822 EXPOSURE TO COVID-19 VIRUS: Primary | ICD-10-CM

## 2021-10-04 LAB — SARS-COV-2 POC: NEGATIVE

## 2021-10-04 PROCEDURE — 87426 SARSCOV CORONAVIRUS AG IA: CPT | Performed by: NURSE PRACTITIONER

## 2021-10-04 PROCEDURE — 99212 OFFICE O/P EST SF 10 MIN: CPT | Performed by: NURSE PRACTITIONER

## 2021-10-04 RX ORDER — FLUTICASONE PROPIONATE AND SALMETEROL 250; 50 UG/1; UG/1
1 POWDER RESPIRATORY (INHALATION) EVERY 12 HOURS
COMMUNITY

## 2021-10-04 NOTE — PROGRESS NOTES
Subjective: (As above and below)       This patient was seen in [facility] at [address] [place] [address] Urgent Care while in their vehicle due to COVID-19 pandemic with PPE and focused examination in order to decrease community viral transmission. The patient/guardian gave verbal consent to treat. Chief Complaint   Patient presents with    Covid Testing     no exp, no symptoms     Traci Callejas is a 48 y.o. female who presents for COVID-19 Exposure and testing. secndary exposure via son at school  Denies any symptoms     Review of Systems - negative except as listed above    Reviewed PmHx, RxHx, FmHx, SocHx, AllgHx and updated in chart. History reviewed. No pertinent family history. Past Medical History:   Diagnosis Date    [facility]     Non Hodgkin's lymphoma ([facility]     Stroke ([facility]       Social History     Socioeconomic History    Marital status:      Spouse name: Not on file    Number of children: Not on file    Years of education: Not on file    Highest education level: Not on file   Tobacco Use    Smoking status: Never Smoker    Smokeless tobacco: Never Used   Substance and Sexual Activity    Alcohol use: Never    Drug use: Never   Social History Narrative    ** Merged History Encounter **          Social Determinants of Health     Financial Resource Strain:     Difficulty of Paying Living Expenses:    Food Insecurity:     Worried About Running Out of Food in the Last Year:     [address] in the Last Year:    Transportation Needs:     Lack of Transportation (Medical):      Lack of Transportation (Non-Medical):    Physical Activity:     Days of Exercise per Week:     Minutes of Exercise per Session:    Stress:     Feeling of Stress :    Social Connections:     Frequency of Communication with Friends and Family:     Frequency of Social Gatherings with Friends and Family:     Attends [employer] Services:     Active Member of Clubs or Organizations:     Attends [employer] or Organization Meetings:     Marital Status:           Current Outpatient Medications   Medication Sig    fluticasone propion-salmeteroL (Advair Diskus) 250-50 mcg/dose diskus inhaler Take 1 Puff by inhalation every twelve (12) hours.  omeprazole (PRILOSEC) 20 mg capsule Take 20 mg by mouth daily.  albuterol (PROVENTIL HFA, VENTOLIN HFA, PROAIR HFA) 90 mcg/actuation inhaler Take 2 Puffs by inhalation every four (4) hours as needed for Wheezing.  atorvastatin (LIPITOR) 10 mg tablet Take  by mouth daily.  acyclovir (ZOVIRAX) 800 mg tablet Take 1,000 mg by mouth daily.  ascorbic acid (VITAMIN C) 500 mg tablet Take 500 mg by mouth daily.  sertraline (ZOLOFT) 50 mg tablet Take 100 mg by mouth daily.  raNITIdine (Zantac) 150 mg tablet Take 150 mg by mouth two (2) times a day. (Patient not taking: Reported on 10/4/2021)    fluconazole (DIFLUCAN) 150 mg tablet Take 150 mg by mouth every seven (7) days. FDA advises cautious prescribing of oral fluconazole in pregnancy. (Patient not taking: Reported on 10/4/2021)     No current facility-administered medications for this visit. Objective:     Vitals:    10/04/21 1739   Pulse: 73   Resp: 16   Temp: 98 °F (36.7 °C)   SpO2: 97%       Physical Exam  General appearance  appears well hydrated and does not appear toxic, no acute distress  Eyes - EOMs intact. Non injected. Ears - no external swelling  Neck/Lymphatics  no obvious swelling  Chest - normal breathing effort. No active cough heard. No audible wheezing. Heart - HR-see vitals  Skin - no observable rashes or pallor  Neurologic- alert and oriented x 3  Psychiatric- normal mood, behavior and though content. Assessment/ Plan:     1. Exposure to COVID-19 virus  - AMB POC SARS-COV-2  - NOVEL CORONAVIRUS (COVID-19);  Future  - NOVEL CORONAVIRUS (COVID-19)      Will test for COVID-19 given exposure  Rapid covid 19 test result:   Results for orders placed or performed in visit on 10/04/21   AMB POC SARS-COV-2   Result Value Ref Range    SARS-COV-2 POC Negative Negative       Supportive home care for any development of mild symptoms - tylenol, maintain adequate fluid intake, deep breathing exercises  Self isolate/quarantine advised based on recommendations in current CDC guidelines.            Margareth Calabrese, NP

## 2021-10-06 LAB
SARS-COV-2, NAA 2 DAY TAT: NORMAL
SARS-COV-2, NAA: NOT DETECTED

## 2022-02-09 ENCOUNTER — OFFICE VISIT (OUTPATIENT)
Dept: URGENT CARE | Age: 47
End: 2022-02-09
Payer: MEDICAID

## 2022-02-09 VITALS
BODY MASS INDEX: 35.34 KG/M2 | WEIGHT: 207 LBS | HEART RATE: 74 BPM | HEIGHT: 64 IN | OXYGEN SATURATION: 99 % | SYSTOLIC BLOOD PRESSURE: 129 MMHG | DIASTOLIC BLOOD PRESSURE: 84 MMHG | RESPIRATION RATE: 16 BRPM | TEMPERATURE: 98.2 F

## 2022-02-09 DIAGNOSIS — J02.9 SORE THROAT: ICD-10-CM

## 2022-02-09 DIAGNOSIS — R09.81 NASAL CONGESTION: Primary | ICD-10-CM

## 2022-02-09 LAB
S PYO AG THROAT QL: NEGATIVE
SARS-COV-2 PCR, POC: NEGATIVE
VALID INTERNAL CONTROL?: YES

## 2022-02-09 PROCEDURE — 99213 OFFICE O/P EST LOW 20 MIN: CPT | Performed by: NURSE PRACTITIONER

## 2022-02-09 PROCEDURE — 87635 SARS-COV-2 COVID-19 AMP PRB: CPT | Performed by: NURSE PRACTITIONER

## 2022-02-09 PROCEDURE — 87880 STREP A ASSAY W/OPTIC: CPT | Performed by: NURSE PRACTITIONER

## 2022-02-09 NOTE — PROGRESS NOTES
Subjective: (As above and below)     The patient/guardian gave verbal consent to treat. Chief Complaint   Patient presents with    Cold Symptoms     Sore throat . No known covid exposure     Donovan Mccray is a 48 y.o. female who presents for evaluation of : sore throat, nasal congestion. Symptom onset 2-3 days ago . Associated decreased appetite but is still drinking plenty of fluids and eating bland foods. No other identified aggravating or alleviating factors. Symptoms are constant and overall unchanged. Denies shortness or breathing, measured fever, chest pain, severe weakness    Known Exposure to COVID-19: no  Is up to date on covid 19 vaccine      ROS  Review of Systems - negative except as listed above    Reviewed PmHx, RxHx, FmHx, SocHx, AllgHx and updated in chart. History reviewed. No pertinent family history. Past Medical History:   Diagnosis Date    [facility] ([facility]     Non Hodgkin's lymphoma ([facility]     Stroke ([facility]       Social History     Socioeconomic History    Marital status:    Tobacco Use    Smoking status: Never Smoker    Smokeless tobacco: Never Used   Substance and Sexual Activity    Alcohol use: Never    Drug use: Never   Social History Narrative    ** Merged History Encounter **               Current Outpatient Medications   Medication Sig    fluticasone propion-salmeteroL (Advair Diskus) 250-50 mcg/dose diskus inhaler Take 1 Puff by inhalation every twelve (12) hours.  omeprazole (PRILOSEC) 20 mg capsule Take 20 mg by mouth daily.  albuterol (PROVENTIL HFA, VENTOLIN HFA, PROAIR HFA) 90 mcg/actuation inhaler Take 2 Puffs by inhalation every four (4) hours as needed for Wheezing.  atorvastatin (LIPITOR) 10 mg tablet Take  by mouth daily.  acyclovir (ZOVIRAX) 800 mg tablet Take 1,000 mg by mouth daily.  ascorbic acid (VITAMIN C) 500 mg tablet Take 500 mg by mouth daily.  sertraline (ZOLOFT) 50 mg tablet Take 100 mg by mouth daily.      No current facility-administered medications for this visit. Objective:     Vitals:    02/09/22 1309   BP: 129/84   Pulse: 74   Resp: 16   Temp: 98.2 °F (36.8 °C)   SpO2: 99%   Weight: 207 lb (93.9 kg)   Height: 5' 4\" (1.626 m)       Physical Exam  General appearance  appears well hydrated and does not appear toxic, no acute distress  Eyes - EOMs intact. Non injected. No scleral icterus   Ears - no external swelling  Nose - nasal congestion. No purulent drainage  Mouth - OP clear without swelling, exudate or lesion. Mucus membranes moist. Uvula midline. Neck/Lymphatics  trachea midline, full AROM  Chest - Normal breathing effort no wheeze rales, rhonchi or diminishments bilaterally. Heart - RRR, no murmurs  Skin - no observable rashes or pallor  Neurologic- alert and oriented x 3  Psychiatric- normal mood, behavior and though content. Assessment/ Plan:     1. Nasal congestion      2. Sore throat    - AMB POC RAPID STREP A  - POCT COVID-19, SARS-COV-2, PCR    Covid 19 test result today NEGATIVE. Rapid strep test also negative. suspect possible early viral illness. No evidence suggesting complication of illness at this time. Will discharge home with close monitoring and follow up. Supportive home care for mild symptoms advised- maintain adequate fluid intake, over the counter Tylenol (for fever, aches, pains, chills), deep breathing exercises        Test Results:  Recent Results (from the past 6 hour(s))   POCT COVID-19, SARS-COV-2, PCR    Collection Time: 02/09/22  1:36 PM   Result Value Ref Range    SARS-COV-2 PCR, POC Negative Negative   AMB POC RAPID STREP A    Collection Time: 02/09/22  2:01 PM   Result Value Ref Range    VALID INTERNAL CONTROL POC Yes     Group A Strep Ag Negative Negative       Follow up: We have reviewed worsening/concerning signs and symptoms that may warrant seeking immediate care in the ED setting.  For other non-severe changes, non-improvement or questions, patient aware to contact PCP office or consider a virtual online medical consultation.         Lyudmila Singh NP

## 2022-03-19 PROBLEM — I73.9 PERIPHERAL VASCULAR DISEASE (HCC): Status: ACTIVE | Noted: 2019-09-27

## 2022-05-12 ENCOUNTER — OFFICE VISIT (OUTPATIENT)
Dept: URGENT CARE | Age: 47
End: 2022-05-12
Payer: MEDICAID

## 2022-05-12 VITALS
RESPIRATION RATE: 16 BRPM | SYSTOLIC BLOOD PRESSURE: 132 MMHG | TEMPERATURE: 98.5 F | DIASTOLIC BLOOD PRESSURE: 88 MMHG | WEIGHT: 202 LBS | OXYGEN SATURATION: 97 % | HEART RATE: 69 BPM | BODY MASS INDEX: 34.67 KG/M2

## 2022-05-12 DIAGNOSIS — J31.1 POST-NASAL CATARRH: ICD-10-CM

## 2022-05-12 DIAGNOSIS — Z85.72: ICD-10-CM

## 2022-05-12 DIAGNOSIS — R09.82 POST-NASAL DRIP: ICD-10-CM

## 2022-05-12 DIAGNOSIS — J98.9 RESPIRATORY COMPLICATION: Primary | ICD-10-CM

## 2022-05-12 DIAGNOSIS — J02.9 SORE THROAT: ICD-10-CM

## 2022-05-12 LAB
S PYO AG THROAT QL: NEGATIVE
SARS-COV-2 PCR, POC: NEGATIVE
VALID INTERNAL CONTROL?: YES

## 2022-05-12 PROCEDURE — 87880 STREP A ASSAY W/OPTIC: CPT | Performed by: INTERNAL MEDICINE

## 2022-05-12 PROCEDURE — 87635 SARS-COV-2 COVID-19 AMP PRB: CPT | Performed by: INTERNAL MEDICINE

## 2022-05-12 PROCEDURE — 99213 OFFICE O/P EST LOW 20 MIN: CPT | Performed by: INTERNAL MEDICINE

## 2022-05-12 RX ORDER — MINERAL OIL
ENEMA (ML) RECTAL
COMMUNITY
Start: 2021-12-05

## 2022-05-12 RX ORDER — MONTELUKAST SODIUM 10 MG/1
TABLET ORAL
COMMUNITY

## 2022-05-12 RX ORDER — FAMOTIDINE 20 MG/1
20 TABLET, FILM COATED ORAL AT BEDTIME
COMMUNITY
Start: 2022-05-03

## 2022-05-12 NOTE — PROGRESS NOTES
HISTORY OF PRESENT ILLNESS  Edgar Myers is a 48 y.o. female. Patient was seen after reporting a sore throat and post nasal drip since yesterday. Has a minor cough. PMH of lymphoma and may have asthma. No fever. Is able to keep down food and fluids. No CP or SOB. Her child was sick earlier this week. Visit Vitals  /88   Pulse 69   Temp 98.5 °F (36.9 °C)   Resp 16   Wt 202 lb (91.6 kg)   SpO2 97%   BMI 34.67 kg/m²     Past Medical History:   Diagnosis Date    Lymphoma ([facility]     Non Hodgkin's lymphoma ([facility]     Stroke ([facility]    History reviewed. No pertinent surgical history. History reviewed. No pertinent family history. Outpatient Encounter Medications as of 5/12/2022   Medication Sig Dispense Refill    famotidine (PEPCID) 20 mg tablet Take 20 mg by mouth At bedtime.  fexofenadine (ALLEGRA) 180 mg tablet       fluticasone propion-salmeteroL (Advair Diskus) 250-50 mcg/dose diskus inhaler Take 1 Puff by inhalation every twelve (12) hours.  omeprazole (PRILOSEC) 20 mg capsule Take 20 mg by mouth daily.  albuterol (PROVENTIL HFA, VENTOLIN HFA, PROAIR HFA) 90 mcg/actuation inhaler Take 2 Puffs by inhalation every four (4) hours as needed for Wheezing. 1 Inhaler 0    atorvastatin (LIPITOR) 10 mg tablet Take  by mouth daily.  ascorbic acid (VITAMIN C) 500 mg tablet Take 500 mg by mouth daily.  sertraline (ZOLOFT) 50 mg tablet Take 100 mg by mouth daily.  montelukast (Singulair) 10 mg tablet 1 tab(s)      acyclovir (ZOVIRAX) 800 mg tablet Take 1,000 mg by mouth daily. No facility-administered encounter medications on file as of 5/12/2022. HPI    Review of Systems   Constitutional: Negative. HENT: Positive for sore throat. Negative for congestion, ear pain and sinus pain. Respiratory: Negative. Cardiovascular: Negative. Neurological: Negative. Psychiatric/Behavioral: Negative. Physical Exam  Vitals and nursing note reviewed.    Constitutional: General: She is not in acute distress. HENT:      Right Ear: Tympanic membrane normal.      Left Ear: Tympanic membrane normal.      Nose: No congestion. Mouth/Throat:      Pharynx: Posterior oropharyngeal erythema present. No oropharyngeal exudate. Cardiovascular:      Rate and Rhythm: Normal rate and regular rhythm. Pulmonary:      Effort: Pulmonary effort is normal.      Breath sounds: Normal breath sounds. Abdominal:      Palpations: Abdomen is soft. Skin:     General: Skin is warm. Neurological:      Mental Status: She is alert and oriented to person, place, and time. Psychiatric:         Behavior: Behavior normal.         ASSESSMENT and PLAN  Diagnoses and all orders for this visit:    1. Sore throat  -     AMB POC SARS-COV-2  -     AMB POC RAPID STREP A  -     UPPER RESPIRATORY CULTURE    2. Respiratory complication    3. Post-nasal catarrh    4. Post-nasal drip  -     AMB POC SARS-COV-2  -     AMB POC RAPID STREP A    5.  Hx of malignant lymphoma          lab results and schedule of future lab studies reviewed with patient  reviewed medications and side effects in detail

## 2022-05-16 LAB — BACTERIA SPEC RESP CULT: NORMAL

## 2023-01-17 ENCOUNTER — CLINICAL SUPPORT (OUTPATIENT)
Dept: PRIMARY CARE CLINIC | Age: 48
End: 2023-01-17
Payer: COMMERCIAL

## 2023-01-17 VITALS
TEMPERATURE: 97.3 F | OXYGEN SATURATION: 98 % | HEIGHT: 64 IN | SYSTOLIC BLOOD PRESSURE: 119 MMHG | DIASTOLIC BLOOD PRESSURE: 85 MMHG | BODY MASS INDEX: 35.34 KG/M2 | RESPIRATION RATE: 16 BRPM | WEIGHT: 207 LBS | HEART RATE: 70 BPM

## 2023-01-17 DIAGNOSIS — J11.1 INFLUENZA-LIKE ILLNESS: Primary | ICD-10-CM

## 2023-01-17 DIAGNOSIS — R68.89 FLU-LIKE SYMPTOMS: ICD-10-CM

## 2023-01-17 LAB
FLUAV+FLUBV AG NOSE QL IA.RAPID: NEGATIVE
FLUAV+FLUBV AG NOSE QL IA.RAPID: NEGATIVE
S PYO AG THROAT QL: NEGATIVE
SARS-COV-2 PCR, POC: NEGATIVE
VALID INTERNAL CONTROL?: YES
VALID INTERNAL CONTROL?: YES

## 2023-01-17 RX ORDER — PSEUDOEPHEDRINE HYDROCHLORIDE 60 MG/1
60 TABLET ORAL
Qty: 20 TABLET | Refills: 0 | Status: SHIPPED | OUTPATIENT
Start: 2023-01-17 | End: 2023-01-22

## 2023-01-17 NOTE — PROGRESS NOTES
Chief Complaint   Patient presents with    Cold Symptoms     Was tested for Covid and Flu thru drive in and was negative. HISTORY OF PRESENT ILLNESS  Teodoro Alvarado is a 50 y.o. female. Onset of sore throat describes as scratchy and sneezing onset last night. Associated chills, nasal congestion and sinus pressure. No OTC for symptom relief, taking vitamin c and zinc. She states her  had similar symptoms recently. Negative covid and flu PTA. She is concerned for strep throat. Review of Systems   Constitutional:  Positive for chills. HENT:  Positive for congestion and sore throat. Respiratory:  Positive for cough. Negative for shortness of breath and wheezing. Cardiovascular: Negative. Gastrointestinal: Negative. Musculoskeletal: Negative. Physical Exam  Vitals and nursing note reviewed. HENT:      Right Ear: Tympanic membrane normal.      Left Ear: Tympanic membrane normal.      Nose: Rhinorrhea present. Mouth/Throat:      Pharynx: Oropharynx is clear. No oropharyngeal exudate or posterior oropharyngeal erythema. Comments: No localized LAD  Cardiovascular:      Rate and Rhythm: Normal rate and regular rhythm. Pulmonary:      Effort: Pulmonary effort is normal. No respiratory distress. Breath sounds: Normal breath sounds. Musculoskeletal:      Cervical back: Neck supple. Neurological:      Mental Status: She is alert. Past Medical History:   Diagnosis Date    Lymphoma ([facility]     Non Hodgkin's lymphoma ([facility]     Stroke [facility])      History reviewed. No pertinent surgical history.   /85 (BP 1 Location: Right arm, BP Patient Position: Sitting, BP Cuff Size: Adult)   Pulse 70   Temp 97.3 °F (36.3 °C) (Temporal)   Resp 16   Ht 5' 4\" (1.626 m)   Wt 207 lb (93.9 kg)   SpO2 98%   BMI 35.53 kg/m²   Results for orders placed or performed in visit on 01/17/23   AMB POC KATIE INFLUENZA A/B TEST   Result Value Ref Range    VALID INTERNAL CONTROL POC Yes Influenza A Ag POC Negative Negative    Influenza B Ag POC Negative Negative   POCT COVID-19, SARS-COV-2, PCR   Result Value Ref Range    SARS-COV-2 PCR, POC Negative Negative    ASSESSMENT and PLAN  1. Influenza-like illness  -     pseudoephedrine (SUDAFED) 60 mg tablet; Take 1 Tablet by mouth every six (6) hours as needed for Congestion for up to 5 days. , Normal, Disp-20 Tablet, R-0 (Patient not taking: Reported on 1/18/2023)  2. Flu-like symptoms  -     AMB POC KATIE INFLUENZA A/B TEST  -     POCT COVID-19, SARS-COV-2, PCR  -     AMB POC RAPID STREP A  Discussed with patient likely viral illness that should resolve on it's own. Recommended rest, push fluids, and take tylenol or ibuprofen for fever or symptom relief as needed. Instructed to seek additional care if does not resolve or symptoms worsens.    DAVIN Starr

## 2023-01-17 NOTE — PROGRESS NOTES
Identified pt with two pt identifiers(name and ). Reviewed record in preparation for visit and have obtained necessary documentation. Chief Complaint   Patient presents with    Cold Symptoms     Was tested for Covid and Flu thru drive in and was negative. Vitals:    23 1736   BP: 119/85   Pulse: 70   Resp: 16   Temp: 97.3 °F (36.3 °C)   TempSrc: Temporal   SpO2: 98%   Weight: 207 lb (93.9 kg)   Height: 5' 4\" (1.626 m)       Health Maintenance Due   Topic    Hepatitis C Screening     Depression Screen     Lipid Screen     Cervical cancer screen     Colorectal Cancer Screening Combo     COVID-19 Vaccine (3 - Booster for Pfizer series)    Flu Vaccine (1)       Coordination of Care Questionnaire:  :   1) Have you been to an emergency room, urgent care, or hospitalized since your last visit? If yes, where when, and reason for visit? no       2. Have seen or consulted any other health care provider since your last visit? If yes, where when, and reason for visit? NO      Patient is accompanied by self I have received verbal consent from [facility] to discuss any/all medical information while they are present in the room. An electronic signature was used to authenticate this note.   -- Chaparrita Jessica LPN

## 2023-01-18 ENCOUNTER — OFFICE VISIT (OUTPATIENT)
Dept: PRIMARY CARE CLINIC | Age: 48
End: 2023-01-18
Payer: COMMERCIAL

## 2023-01-18 VITALS
HEART RATE: 91 BPM | TEMPERATURE: 96.4 F | DIASTOLIC BLOOD PRESSURE: 74 MMHG | SYSTOLIC BLOOD PRESSURE: 115 MMHG | RESPIRATION RATE: 18 BRPM | BODY MASS INDEX: 35.34 KG/M2 | WEIGHT: 207 LBS | OXYGEN SATURATION: 96 % | HEIGHT: 64 IN

## 2023-01-18 DIAGNOSIS — J06.9 VIRAL URI: ICD-10-CM

## 2023-01-18 DIAGNOSIS — H69.82 EUSTACHIAN TUBE DYSFUNCTION, LEFT: ICD-10-CM

## 2023-01-18 DIAGNOSIS — R68.89 FLU-LIKE SYMPTOMS: Primary | ICD-10-CM

## 2023-01-18 LAB
FLUAV+FLUBV AG NOSE QL IA.RAPID: NEGATIVE
FLUAV+FLUBV AG NOSE QL IA.RAPID: NEGATIVE
SARS-COV-2 PCR, POC: NEGATIVE
VALID INTERNAL CONTROL?: YES

## 2023-01-18 PROCEDURE — 87635 SARS-COV-2 COVID-19 AMP PRB: CPT

## 2023-01-18 PROCEDURE — 99214 OFFICE O/P EST MOD 30 MIN: CPT

## 2023-01-18 PROCEDURE — 87804 INFLUENZA ASSAY W/OPTIC: CPT

## 2023-01-18 RX ORDER — ALBUTEROL SULFATE 90 UG/1
1 AEROSOL, METERED RESPIRATORY (INHALATION)
Qty: 18 G | Refills: 0 | Status: SHIPPED | OUTPATIENT
Start: 2023-01-18

## 2023-01-18 RX ORDER — METHYLPREDNISOLONE 4 MG/1
TABLET ORAL
Qty: 1 DOSE PACK | Refills: 0 | Status: SHIPPED | OUTPATIENT
Start: 2023-01-18

## 2023-01-19 NOTE — PATIENT INSTRUCTIONS
- Nasacort as directed  - Advised to  SUdafed as prescribed  - Do not take NSAIDS (motrin, aleve, naproxen etc.) while on steroids. Take steroids in the morning, will keep you up at night.

## 2023-01-19 NOTE — PROGRESS NOTES
Identified pt with two pt identifiers(name and ). Reviewed record in preparation for visit and have obtained necessary documentation. Chief Complaint   Patient presents with    Cough     Seen yesterday; worsening cough/burning in chest/shortness of breath      Results for orders placed or performed in visit on 23   POCT COVID-19, SARS-COV-2, PCR   Result Value Ref Range    SARS-COV-2 PCR, POC Negative Negative   AMB POC KATIE INFLUENZA A/B TEST   Result Value Ref Range    VALID INTERNAL CONTROL POC Yes     Influenza A Ag POC Negative Negative    Influenza B Ag POC Negative Negative     Vitals:    23 1949   BP: 115/74   Pulse: 91   Resp: 18   Temp: (!) 96.4 °F (35.8 °C)   TempSrc: Temporal   SpO2: 96%   Weight: 207 lb (93.9 kg)   Height: 5' 4\" (1.626 m)   PainSc:   5   PainLoc: Throat     Results for orders placed or performed in visit on 23   POCT COVID-19, SARS-COV-2, PCR   Result Value Ref Range    SARS-COV-2 PCR, POC Negative Negative   AMB POC KATIE INFLUENZA A/B TEST   Result Value Ref Range    VALID INTERNAL CONTROL POC Yes     Influenza A Ag POC Negative Negative    Influenza B Ag POC Negative Negative     Health Maintenance Review: Patient reminded of \"due or due soon\" health maintenance. I have asked the patient to contact his/her primary care provider (PCP) for follow-up on his/her health maintenance. Coordination of Care Questionnaire:  :   1) Have you been to an emergency room, urgent care, or hospitalized since your last visit? If yes, where when, and reason for visit? no       2. Have seen or consulted any other health care provider since your last visit? If yes, where when, and reason for visit? NO      Patient is accompanied by self I have received verbal consent from [facility] to discuss any/all medical information while they are present in the room.

## 2023-01-19 NOTE — PROGRESS NOTES
Chief Complaint   Patient presents with    Cough     Seen yesterday; worsening cough/burning in chest/shortness of breath     HISTORY OF PRESENT ILLNESS  Bright Cherry is a 50 y.o. female. Patient reports  she was seen here yesterday for sore throat, runny nose, increased SOB, chest burning from coughing. Today now having worsening in cough. Hx of lymphoma and scarring in lungs from chemotherapy. Reports she has not taken Sudafed prescribed yesterday. Denies taking anything for symptoms other then vitamin c, tylenol and zinc. Denies fever. Patient tested negtive in clinic for both flu and covid this evening. Reports  has same symptoms but tested negative. Review of Systems   Constitutional:  Negative for chills and fever. HENT:  Positive for congestion, ear pain and sore throat. Eyes: Negative. Respiratory:  Positive for cough. Negative for shortness of breath. Cardiovascular: Negative. Gastrointestinal: Negative. Genitourinary: Negative. Musculoskeletal: Negative. Skin: Negative. Neurological:  Negative for loss of consciousness. Endo/Heme/Allergies: Negative. Psychiatric/Behavioral: Negative. Physical Exam  Constitutional:       General: She is not in acute distress. Appearance: Normal appearance. She is not ill-appearing. HENT:      Head: Normocephalic. Right Ear: Tympanic membrane normal.      Left Ear: A middle ear effusion is present. Nose: Congestion and rhinorrhea present. Mouth/Throat:      Mouth: Mucous membranes are moist.      Pharynx: No oropharyngeal exudate or posterior oropharyngeal erythema. Eyes:      Pupils: Pupils are equal, round, and reactive to light. Cardiovascular:      Rate and Rhythm: Normal rate. Pulses: Normal pulses. Heart sounds: Normal heart sounds. Pulmonary:      Effort: Pulmonary effort is normal.      Breath sounds: Normal breath sounds. Abdominal:      Palpations: Abdomen is soft. Musculoskeletal:         General: Normal range of motion. Cervical back: Normal range of motion and neck supple. Skin:     General: Skin is warm. Neurological:      General: No focal deficit present. Mental Status: She is alert and oriented to person, place, and time. Psychiatric:         Mood and Affect: Mood normal.         Behavior: Behavior normal.     Past Medical History:   Diagnosis Date    Lymphoma ([facility]     Non Hodgkin's lymphoma ([facility]     Stroke ([facility]      History reviewed. No pertinent surgical history. /74 (BP 1 Location: Left arm, BP Patient Position: Sitting)   Pulse 91   Temp (!) 96.4 °F (35.8 °C) (Temporal)   Resp 18   Ht 5' 4\" (1.626 m)   Wt 207 lb (93.9 kg)   SpO2 96%   BMI 35.53 kg/m²   Results for orders placed or performed in visit on 01/18/23   POCT COVID-19, SARS-COV-2, PCR   Result Value Ref Range    SARS-COV-2 PCR, POC Negative Negative   AMB POC KATIE INFLUENZA A/B TEST   Result Value Ref Range    VALID INTERNAL CONTROL POC Yes     Influenza A Ag POC Negative Negative    Influenza B Ag POC Negative Negative        ASSESSMENT and PLAN  1. Flu-like symptoms  -     POCT COVID-19, SARS-COV-2, PCR  -     AMB POC KATIE INFLUENZA A/B TEST  2. Eustachian tube dysfunction, left  -     methylPREDNISolone (MEDROL DOSEPACK) 4 mg tablet; Take as directed., Normal, Disp-1 Dose Pack, R-0  3. Viral URI  -     albuterol (PROVENTIL HFA, VENTOLIN HFA, PROAIR HFA) 90 mcg/actuation inhaler;  Take 1 Puff by inhalation every four (4) hours as needed for Cough., Normal, Disp-18 g, R-0  - Advised to  rx and start taking as soon as possible      Da Hirsch NP

## 2023-10-17 ENCOUNTER — OFFICE VISIT (OUTPATIENT)
Age: 48
End: 2023-10-17

## 2023-10-17 VITALS
SYSTOLIC BLOOD PRESSURE: 117 MMHG | BODY MASS INDEX: 31.24 KG/M2 | DIASTOLIC BLOOD PRESSURE: 80 MMHG | HEART RATE: 67 BPM | OXYGEN SATURATION: 99 % | WEIGHT: 182 LBS | TEMPERATURE: 98.2 F

## 2023-10-17 DIAGNOSIS — J02.9 SORE THROAT: ICD-10-CM

## 2023-10-17 DIAGNOSIS — J06.9 VIRAL UPPER RESPIRATORY ILLNESS: Primary | ICD-10-CM

## 2023-10-17 DIAGNOSIS — R07.9 CHEST PAIN, UNSPECIFIED TYPE: ICD-10-CM

## 2023-10-17 LAB
STREP PYOGENES DNA, POC: NEGATIVE
VALID INTERNAL CONTROL, POC: YES

## 2023-10-17 RX ORDER — BENZONATATE 200 MG/1
200 CAPSULE ORAL 3 TIMES DAILY PRN
Qty: 30 CAPSULE | Refills: 0 | Status: SHIPPED | OUTPATIENT
Start: 2023-10-17 | End: 2023-10-27

## 2023-10-18 LAB
Lab: NORMAL
QC PASS/FAIL: NORMAL
SARS-COV-2 RDRP RESP QL NAA+PROBE: NEGATIVE

## 2025-09-04 ENCOUNTER — OFFICE VISIT (OUTPATIENT)
Age: 50
End: 2025-09-04

## 2025-09-04 VITALS
OXYGEN SATURATION: 96 % | WEIGHT: 192 LBS | TEMPERATURE: 97.8 F | BODY MASS INDEX: 32.96 KG/M2 | DIASTOLIC BLOOD PRESSURE: 84 MMHG | RESPIRATION RATE: 18 BRPM | HEART RATE: 69 BPM | SYSTOLIC BLOOD PRESSURE: 128 MMHG

## 2025-09-04 DIAGNOSIS — R07.9 CHEST PAIN, UNSPECIFIED TYPE: Primary | ICD-10-CM

## 2025-09-04 DIAGNOSIS — R53.83 OTHER FATIGUE: ICD-10-CM

## 2025-09-04 DIAGNOSIS — R06.02 SHORTNESS OF BREATH: ICD-10-CM

## 2025-09-04 LAB
Lab: NORMAL
PERFORMING INSTRUMENT: NORMAL
QC PASS/FAIL: NORMAL
SARS-COV-2, POC: NORMAL

## 2025-09-04 RX ORDER — BUPROPION HYDROCHLORIDE 150 MG/1
TABLET ORAL
COMMUNITY
Start: 2024-11-11

## 2025-09-04 RX ORDER — FERROUS SULFATE 325(65) MG
325 TABLET ORAL EVERY OTHER DAY
COMMUNITY
Start: 2025-05-11

## 2025-09-04 RX ORDER — CLOBETASOL PROPIONATE 0.5 MG/G
OINTMENT TOPICAL
COMMUNITY
Start: 2025-05-07

## 2025-09-04 ASSESSMENT — ENCOUNTER SYMPTOMS
NAUSEA: 1
RHINORRHEA: 0
SORE THROAT: 0
SHORTNESS OF BREATH: 1
CHEST TIGHTNESS: 0
COUGH: 0